# Patient Record
Sex: FEMALE | Race: WHITE | Employment: UNEMPLOYED | URBAN - METROPOLITAN AREA
[De-identification: names, ages, dates, MRNs, and addresses within clinical notes are randomized per-mention and may not be internally consistent; named-entity substitution may affect disease eponyms.]

---

## 2022-10-07 RX ORDER — BUPROPION HYDROCHLORIDE 100 MG/1
100 TABLET ORAL 3 TIMES DAILY
COMMUNITY

## 2022-10-07 RX ORDER — ESCITALOPRAM OXALATE 20 MG/1
20 TABLET ORAL NIGHTLY
COMMUNITY
Start: 2022-03-08

## 2022-10-07 RX ORDER — AMITRIPTYLINE HYDROCHLORIDE 50 MG/1
50 TABLET, FILM COATED ORAL NIGHTLY
COMMUNITY
Start: 2016-05-06

## 2022-10-07 RX ORDER — GABAPENTIN 300 MG/1
CAPSULE ORAL
COMMUNITY
Start: 2019-10-11 | End: 2022-10-23

## 2022-10-07 RX ORDER — BUSPIRONE HYDROCHLORIDE 10 MG/1
10 TABLET ORAL 2 TIMES DAILY
COMMUNITY

## 2022-10-07 RX ORDER — ATORVASTATIN CALCIUM 20 MG/1
TABLET, FILM COATED ORAL
COMMUNITY
Start: 2021-04-22

## 2022-10-07 NOTE — PROGRESS NOTES
[address] [facility] time ____1445________        Surgery time ______1615______    Take the following medications with a sip of water: Follow your MD/Surgeons pre-procedure instructions regarding your medications     Do not eat or drink anything after 12:00 midnight prior to your surgery. This includes water chewing gum, mints and ice chips. You may brush your teeth and gargle the morning of your surgery, but do not swallow the water     Please see your family doctor/pediatrician for a history and physical and/or concerning medications. Bring any test results/reports from your physicians office. If you are under the care of a heart doctor or specialist doctor, please be aware that you may be asked to them for clearance    You may be asked to stop blood thinners such as Coumadin, Plavix, Fragmin, Lovenox, etc., or any anti-inflammatories such as:  Aspirin, Ibuprofen, Advil, Naproxen prior to your surgery. We also ask that you stop any OTC medications such as fish oil, vitamin E, glucosamine, garlic, Multivitamins, COQ 10, etc.    We ask that you do not smoke 24 hours prior to surgery  We ask that you do not  drink any alcoholic beverages 24 hours prior to surgery     You must make arrangements for a responsible adult to take you home after your surgery. For your safety you will not be allowed to leave alone or drive yourself home. Your surgery will be cancelled if you do not have a ride home. Also for your safety, it is strongly suggested that someone stay with you the first 24 hours after your surgery. A parent or legal guardian must accompany a child scheduled for surgery and plan to stay at the hospital until the child is discharged. Please do not bring other children with you. For your comfort, please wear simple loose fitting clothing to the hospital.  Please do not bring valuables.     Do not wear any make-up or nail polish on your fingers or toes      For your safety, please do not wear any jewelry or body piercing's on the day of surgery. All jewelry must be removed. If you have dentures, they will be removed before going to operating room. For your convenience, we will provide you with a container. If you wear contact lenses or glasses, they will be removed, please bring a case for them. If you have a living will and a durable power of  for healthcare, please bring in a copy. As part of our patient safety program to minimize surgical site infections, we ask you to do the following:    Please notify your surgeon if you develop any illness between         now and the  day of your surgery. This includes a cough, cold, fever, sore throat, nausea,         or vomiting, and diarrhea, etc.   Please notify your surgeon if you experience dizziness, shortness         of breath or blurred vision between now and the time of your surgery. Do not shave your operative site 96 hours prior to surgery. For face and neck surgery, men may use an electric razor 48 hours   prior to surgery. You may shower the night before surgery or the morning of   your surgery with an antibacterial soap. You will need to bring a photo ID and insurance card    [facility] has an onsite pharmacy, would you like to utilize our pharmacy     If you will be staying overnight and use a C-pap machine, please bring   your C-pap to hospital     Our goal is to provide you with excellent care, therefore, visitors will be limited to two(2) in the room at a time so that we may focus on providing this care for you. Please contact pre-admission testing if you have any further questions. [facility] phone number:  [phone] PAT fax number:  [phone]  Please note these are generalized instructions for all surgical cases, you may be provided with more specific instructions according to your surgery.     C-Difficile admission screening and protocol:       * Admitted with diarrhea? [] YES    [x]  NO     *Prior history of C-Diff. In last 3 months? [] YES    [x]  NO     *Antibiotic use in the past 6-8 weeks? [x]  NO    []  YES                 If yes, which ANTIBIOTIC AND REASON______     *Prior hospitalization or nursing home in the last month? []  YES    [x]  NO        SAFETY FIRST. .call before you fall

## 2022-10-14 ENCOUNTER — ANESTHESIA EVENT (OUTPATIENT)
Dept: OPERATING ROOM | Age: 67
End: 2022-10-14
Payer: COMMERCIAL

## 2022-10-15 ASSESSMENT — LIFESTYLE VARIABLES: SMOKING_STATUS: 0

## 2022-10-15 NOTE — ANESTHESIA PRE PROCEDURE
Department of Anesthesiology  Preprocedure Note       Name:  Alexandr Walker   Age:  61 y.o.  :  1955                                          MRN:  [id number]         Date:  10/17/2022      Surgeon: Es Raphael):  Benny Damian MD    Procedure: Procedure(s):  CYSTOSCOPY TRANS VAGINAL TAPE REVISION    Medications prior to admission:   Prior to Admission medications    Medication Sig Start Date End Date Taking? Authorizing Provider   atorvastatin (LIPITOR) 20 MG tablet TAKE 1 TABLET BY MOUTH EVERY DAY  Patient not taking: Reported on 10/17/2022 4/22/21  Yes Historical Provider, MD   amitriptyline (ELAVIL) 50 MG tablet Take 50 mg by mouth nightly 16  Yes Historical Provider, MD   escitalopram (LEXAPRO) 20 MG tablet Take 20 mg by mouth nightly 3/8/22  Yes Historical Provider, MD   gabapentin (NEURONTIN) 300 MG capsule TAKE 1 CAPSULE BY MOUTH 3 TIMES DAILY FOR 90 DAYS. 10/11/19 10/23/22 Yes Historical Provider, MD   metoprolol tartrate (LOPRESSOR) 25 MG tablet TAKE 1/2 TABLET BY MOUTH TWICE A DAY 5/3/21  Yes Historical Provider, MD   buPROPion (WELLBUTRIN) 100 MG tablet Take 100 mg by mouth 3 times daily    Historical Provider, MD   busPIRone (BUSPAR) 10 MG tablet Take 10 mg by mouth 2 times daily    Historical Provider, MD   oxybutynin (DITROPAN) 5 MG tablet Take 5 mg by mouth 2 times daily. Patient not taking: Reported on 10/17/2022    Historical Provider, MD   zolpidem (AMBIEN) 10 MG tablet Take  by mouth nightly as needed for Sleep. Historical Provider, MD   ALPRAZolam Jennifer Schillings) 1 MG tablet Take 1 mg by mouth 2 times daily as needed for Sleep. Historical Provider, MD   sertraline (ZOLOFT) 50 MG tablet Take 50 mg by mouth daily.     Historical Provider, MD       Current medications:    Current Facility-Administered Medications   Medication Dose Route Frequency Provider Last Rate Last Admin    cefTRIAXone (ROCEPHIN) 1,000 mg in dextrose 5 % 50 mL IVPB mini-bag  1,000 mg IntraVENous Once Liss Kong MD        0.9 % sodium chloride infusion   IntraVENous Continuous Carole Luis MD        sodium chloride flush 0.9 % injection 5-40 mL  5-40 mL IntraVENous 2 times per day Carole Luis MD        sodium chloride flush 0.9 % injection 5-40 mL  5-40 mL IntraVENous PRFRANCIS Luis MD        0.9 % sodium chloride infusion   IntraVENous PRFRANCIS Luis MD           Allergies: Allergies   Allergen Reactions    Codeine Itching and Rash       Problem List:    Patient Active Problem List   Diagnosis Code    Carpal tunnel syndrome G56.00       Past Medical History:        Diagnosis Date    Anxiety and depression     Arthritis     Frequent urination     Hyperlipidemia     Hypertension     IBS (irritable bowel syndrome)     Irregular heartbeat     Prolonged emergence from general anesthesia     UTI (lower urinary tract infection)        Past Surgical History:        Procedure Laterality Date    APPENDECTOMY      CARPAL TUNNEL RELEASE Bilateral     bilat.  CYSTOSCOPY  10/20/2014    CYSTOSCOPY, URETHRAL DILITATION           EYE SURGERY Left     removal of debris x3    FOOT SURGERY Right     heel spur etc right    HYSTERECTOMY (CERVIX STATUS UNKNOWN)      part.  vag    JOINT REPLACEMENT Right     total knee replacement    TUBAL LIGATION         Social History:    Social History     Tobacco Use    Smoking status: Never    Smokeless tobacco: Never   Substance Use Topics    Alcohol use: Yes     Comment: rare                                Counseling given: Not Answered      Vital Signs (Current):   Vitals:    10/07/22 1239 10/17/22 1441   BP:  126/84   Pulse:  77   Resp:  18   Temp:  97.3 °F (36.3 °C)   TempSrc:  Temporal   SpO2:  98%   Weight: 140 lb (63.5 kg)    Height: 5' 2\" (1.575 m) 5' 2\" (1.575 m)                                              BP Readings from Last 3 Encounters:   10/17/22 126/84   10/20/14 113/69   09/24/13 139/79       NPO Status: Time of last liquid consumption: 2359                        Time of last solid consumption: 2330                        Date of last liquid consumption: 10/16/22                        Date of last solid food consumption: 10/16/22    BMI:   Wt Readings from Last 3 Encounters:   10/07/22 140 lb (63.5 kg)   10/20/14 101 lb 13.6 oz (46.2 kg)   10/15/14 100 lb (45.4 kg)     Body mass index is 25.61 kg/m². CBC: No results found for: WBC, RBC, HGB, HCT, MCV, RDW, PLT    CMP: No results found for: NA, K, CL, CO2, BUN, CREATININE, GFRAA, AGRATIO, LABGLOM, GLUCOSE, GLU, PROT, CALCIUM, BILITOT, ALKPHOS, AST, ALT    POC Tests: No results for input(s): POCGLU, POCNA, POCK, POCCL, POCBUN, POCHEMO, POCHCT in the last 72 hours. Coags: No results found for: PROTIME, INR, APTT    HCG (If Applicable): No results found for: PREGTESTUR, PREGSERUM, HCG, HCGQUANT     ABGs: No results found for: PHART, PO2ART, KJZ0KFC, AFD0RZP, BEART, M9XTINMV     Type & Screen (If Applicable):  No results found for: LABABO, LABRH    Drug/Infectious Status (If Applicable):  No results found for: HIV, HEPCAB    COVID-19 Screening (If Applicable): No results found for: COVID19        Anesthesia Evaluation   no history of anesthetic complications (prolonged emergence reported in past):   Airway: Mallampati: I  TM distance: >3 FB   Neck ROM: full  Comment: Prior airway:  Present prior to arrival:No; CO2 waveform:Yes; Breath sounds verified:Yes, Equal, Bilateral; ETT Size:7 mm; Cuffed:Cuffed; Direct Laryngoscopy attempts:1; Laryngoscope type:Márquez;  Laryngoscope size:2; Grade view:II; Rapid sequence:No; Cricoid pressure applied:No; Procedure status:No trauma, Dentition as pre-op  Mouth opening: > = 3 FB   Dental:      Comment: Small chip noted on upper incisor    Pulmonary:       (-) COPD, asthma, rhonchi, wheezes, rales and not a current smoker                           Cardiovascular:  Exercise tolerance: good (>4 METS),   (+) hypertension:, dysrhythmias: SVT, hyperlipidemia    (-) CAD (calcium score 17.6), weak pulses and peripheral edema      Rhythm: regular  Rate: normal           Beta Blocker:  Dose within 24 Hrs (metoprolol)      ROS comment: EKG: NSR, wnl       Neuro/Psych:   (+) depression/anxiety  (Xanax)            GI/Hepatic/Renal:        (-) liver disease, no renal disease and no morbid obesity      ROS comment: h/o urge incontenence presenting for revision of urethral sling by Dr. Arpan Mayfield 6/21/2022 at [facility] .   Endo/Other:    (+) : arthritis: OA., .    (-) diabetes mellitus, blood dyscrasia               Abdominal:         (-) obese Abdomen: soft. Vascular: Other Findings: Patient reports she is unable to remove nose ring          Anesthesia Plan      MAC     ASA 2     (Required ephedrine during multiple prior procedures at [facility].  NPO appropriate. Ms. Hood Balderrama denies active nausea / reflux.)      MIPS: Postoperative opioids intended and Prophylactic antiemetics administered. Anesthetic plan and risks discussed with patient. Use of blood products discussed with patient whom consented to blood products. Plan discussed with CRNA. This pre-anesthesia assessment may be used as a history and physical.    DOS STAFF ADDENDUM:    Pt seen and examined, chart reviewed (including anesthesia, drug and allergy history). No interval changes to history and physical examination. Anesthetic plan, risks, benefits, alternatives, and personnel involved discussed with patient. Patient verbalized an understanding and agrees to proceed.       Ruy Pena MD  October 17, 2022  2:53 PM

## 2022-10-17 ENCOUNTER — HOSPITAL ENCOUNTER (OUTPATIENT)
Age: 67
Setting detail: OUTPATIENT SURGERY
Discharge: HOME | End: 2022-10-17
Attending: UROLOGY | Admitting: UROLOGY
Payer: COMMERCIAL

## 2022-10-17 ENCOUNTER — ANESTHESIA (OUTPATIENT)
Dept: OPERATING ROOM | Age: 67
End: 2022-10-17
Payer: COMMERCIAL

## 2022-10-17 VITALS
HEART RATE: 92 BPM | WEIGHT: 140 LBS | OXYGEN SATURATION: 92 % | BODY MASS INDEX: 25.76 KG/M2 | TEMPERATURE: 97.2 F | RESPIRATION RATE: 25 BRPM | HEIGHT: 62 IN | SYSTOLIC BLOOD PRESSURE: 108 MMHG | DIASTOLIC BLOOD PRESSURE: 77 MMHG

## 2022-10-17 PROCEDURE — 7100000010 HC PHASE II RECOVERY - FIRST 15 MIN: Performed by: UROLOGY

## 2022-10-17 PROCEDURE — 6360000002 HC RX W HCPCS: Performed by: UROLOGY

## 2022-10-17 PROCEDURE — 7100000011 HC PHASE II RECOVERY - ADDTL 15 MIN: Performed by: UROLOGY

## 2022-10-17 PROCEDURE — 2580000003 HC RX 258: Performed by: UROLOGY

## 2022-10-17 PROCEDURE — A4217 STERILE WATER/SALINE, 500 ML: HCPCS | Performed by: UROLOGY

## 2022-10-17 PROCEDURE — 3600000014 HC SURGERY LEVEL 4 ADDTL 15MIN: Performed by: UROLOGY

## 2022-10-17 PROCEDURE — 2500000003 HC RX 250 WO HCPCS: Performed by: NURSE ANESTHETIST, CERTIFIED REGISTERED

## 2022-10-17 PROCEDURE — 2580000003 HC RX 258: Performed by: NURSE ANESTHETIST, CERTIFIED REGISTERED

## 2022-10-17 PROCEDURE — 2709999900 HC NON-CHARGEABLE SUPPLY: Performed by: UROLOGY

## 2022-10-17 PROCEDURE — 6360000002 HC RX W HCPCS: Performed by: NURSE ANESTHETIST, CERTIFIED REGISTERED

## 2022-10-17 PROCEDURE — 2500000003 HC RX 250 WO HCPCS: Performed by: UROLOGY

## 2022-10-17 PROCEDURE — 3700000001 HC ADD 15 MINUTES (ANESTHESIA): Performed by: UROLOGY

## 2022-10-17 PROCEDURE — 3600000004 HC SURGERY LEVEL 4 BASE: Performed by: UROLOGY

## 2022-10-17 PROCEDURE — 7100000000 HC PACU RECOVERY - FIRST 15 MIN: Performed by: UROLOGY

## 2022-10-17 PROCEDURE — 7100000001 HC PACU RECOVERY - ADDTL 15 MIN: Performed by: UROLOGY

## 2022-10-17 PROCEDURE — 3700000000 HC ANESTHESIA ATTENDED CARE: Performed by: UROLOGY

## 2022-10-17 RX ORDER — FENTANYL CITRATE 50 UG/ML
INJECTION, SOLUTION INTRAMUSCULAR; INTRAVENOUS PRN
Status: DISCONTINUED | OUTPATIENT
Start: 2022-10-17 | End: 2022-10-17 | Stop reason: SDUPTHER

## 2022-10-17 RX ORDER — ONDANSETRON 2 MG/ML
INJECTION INTRAMUSCULAR; INTRAVENOUS PRN
Status: DISCONTINUED | OUTPATIENT
Start: 2022-10-17 | End: 2022-10-17 | Stop reason: SDUPTHER

## 2022-10-17 RX ORDER — SODIUM CHLORIDE 0.9 % (FLUSH) 0.9 %
5-40 SYRINGE (ML) INJECTION EVERY 12 HOURS SCHEDULED
Status: DISCONTINUED | OUTPATIENT
Start: 2022-10-17 | End: 2022-10-17 | Stop reason: HOSPADM

## 2022-10-17 RX ORDER — PROCHLORPERAZINE EDISYLATE 5 MG/ML
5 INJECTION INTRAMUSCULAR; INTRAVENOUS
Status: DISCONTINUED | OUTPATIENT
Start: 2022-10-17 | End: 2022-10-17 | Stop reason: HOSPADM

## 2022-10-17 RX ORDER — GLYCOPYRROLATE 0.2 MG/ML
INJECTION INTRAMUSCULAR; INTRAVENOUS PRN
Status: DISCONTINUED | OUTPATIENT
Start: 2022-10-17 | End: 2022-10-17 | Stop reason: SDUPTHER

## 2022-10-17 RX ORDER — PROPOFOL 10 MG/ML
INJECTION, EMULSION INTRAVENOUS PRN
Status: DISCONTINUED | OUTPATIENT
Start: 2022-10-17 | End: 2022-10-17 | Stop reason: SDUPTHER

## 2022-10-17 RX ORDER — LABETALOL HYDROCHLORIDE 5 MG/ML
5 INJECTION, SOLUTION INTRAVENOUS
Status: DISCONTINUED | OUTPATIENT
Start: 2022-10-17 | End: 2022-10-17 | Stop reason: HOSPADM

## 2022-10-17 RX ORDER — SODIUM CHLORIDE 9 MG/ML
INJECTION, SOLUTION INTRAVENOUS CONTINUOUS
Status: DISCONTINUED | OUTPATIENT
Start: 2022-10-17 | End: 2022-10-17 | Stop reason: HOSPADM

## 2022-10-17 RX ORDER — SODIUM CHLORIDE 0.9 % (FLUSH) 0.9 %
5-40 SYRINGE (ML) INJECTION PRN
Status: DISCONTINUED | OUTPATIENT
Start: 2022-10-17 | End: 2022-10-17 | Stop reason: HOSPADM

## 2022-10-17 RX ORDER — ONDANSETRON 2 MG/ML
4 INJECTION INTRAMUSCULAR; INTRAVENOUS
Status: DISCONTINUED | OUTPATIENT
Start: 2022-10-17 | End: 2022-10-17 | Stop reason: HOSPADM

## 2022-10-17 RX ORDER — SODIUM CHLORIDE 9 MG/ML
INJECTION, SOLUTION INTRAVENOUS CONTINUOUS PRN
Status: DISCONTINUED | OUTPATIENT
Start: 2022-10-17 | End: 2022-10-17 | Stop reason: SDUPTHER

## 2022-10-17 RX ORDER — MIDAZOLAM HYDROCHLORIDE 1 MG/ML
INJECTION INTRAMUSCULAR; INTRAVENOUS PRN
Status: DISCONTINUED | OUTPATIENT
Start: 2022-10-17 | End: 2022-10-17 | Stop reason: SDUPTHER

## 2022-10-17 RX ORDER — DEXAMETHASONE SODIUM PHOSPHATE 4 MG/ML
INJECTION, SOLUTION INTRA-ARTICULAR; INTRALESIONAL; INTRAMUSCULAR; INTRAVENOUS; SOFT TISSUE PRN
Status: DISCONTINUED | OUTPATIENT
Start: 2022-10-17 | End: 2022-10-17 | Stop reason: SDUPTHER

## 2022-10-17 RX ORDER — LIDOCAINE HYDROCHLORIDE AND EPINEPHRINE 20; 5 MG/ML; UG/ML
INJECTION, SOLUTION EPIDURAL; INFILTRATION; INTRACAUDAL; PERINEURAL
Status: COMPLETED | OUTPATIENT
Start: 2022-10-17 | End: 2022-10-17

## 2022-10-17 RX ORDER — FENTANYL CITRATE 50 UG/ML
25 INJECTION, SOLUTION INTRAMUSCULAR; INTRAVENOUS EVERY 5 MIN PRN
Status: DISCONTINUED | OUTPATIENT
Start: 2022-10-17 | End: 2022-10-17 | Stop reason: HOSPADM

## 2022-10-17 RX ORDER — MAGNESIUM HYDROXIDE 1200 MG/15ML
LIQUID ORAL CONTINUOUS PRN
Status: DISCONTINUED | OUTPATIENT
Start: 2022-10-17 | End: 2022-10-17 | Stop reason: HOSPADM

## 2022-10-17 RX ORDER — MAGNESIUM HYDROXIDE 1200 MG/15ML
LIQUID ORAL
Status: COMPLETED | OUTPATIENT
Start: 2022-10-17 | End: 2022-10-17

## 2022-10-17 RX ORDER — SODIUM CHLORIDE 9 MG/ML
INJECTION, SOLUTION INTRAVENOUS PRN
Status: DISCONTINUED | OUTPATIENT
Start: 2022-10-17 | End: 2022-10-17 | Stop reason: HOSPADM

## 2022-10-17 RX ORDER — IPRATROPIUM BROMIDE AND ALBUTEROL SULFATE 2.5; .5 MG/3ML; MG/3ML
1 SOLUTION RESPIRATORY (INHALATION)
Status: DISCONTINUED | OUTPATIENT
Start: 2022-10-17 | End: 2022-10-17 | Stop reason: HOSPADM

## 2022-10-17 RX ORDER — HYDRALAZINE HYDROCHLORIDE 20 MG/ML
5 INJECTION INTRAMUSCULAR; INTRAVENOUS
Status: DISCONTINUED | OUTPATIENT
Start: 2022-10-17 | End: 2022-10-17 | Stop reason: HOSPADM

## 2022-10-17 RX ORDER — PHENYLEPHRINE HCL IN 0.9% NACL 1 MG/10 ML
SYRINGE (ML) INTRAVENOUS PRN
Status: DISCONTINUED | OUTPATIENT
Start: 2022-10-17 | End: 2022-10-17 | Stop reason: SDUPTHER

## 2022-10-17 RX ORDER — LIDOCAINE HYDROCHLORIDE 20 MG/ML
INJECTION, SOLUTION EPIDURAL; INFILTRATION; INTRACAUDAL; PERINEURAL PRN
Status: DISCONTINUED | OUTPATIENT
Start: 2022-10-17 | End: 2022-10-17 | Stop reason: SDUPTHER

## 2022-10-17 RX ADMIN — DEXAMETHASONE SODIUM PHOSPHATE 8 MG: 4 INJECTION, SOLUTION INTRAMUSCULAR; INTRAVENOUS at 15:58

## 2022-10-17 RX ADMIN — SODIUM CHLORIDE: 9 INJECTION, SOLUTION INTRAVENOUS at 15:49

## 2022-10-17 RX ADMIN — Medication 200 MCG: at 16:19

## 2022-10-17 RX ADMIN — FENTANYL CITRATE 50 MCG: 50 INJECTION INTRAMUSCULAR; INTRAVENOUS at 15:55

## 2022-10-17 RX ADMIN — MIDAZOLAM 2 MG: 1 INJECTION INTRAMUSCULAR; INTRAVENOUS at 15:49

## 2022-10-17 RX ADMIN — GLYCOPYRROLATE 0.2 MG: 0.2 INJECTION, SOLUTION INTRAMUSCULAR; INTRAVENOUS at 15:58

## 2022-10-17 RX ADMIN — FENTANYL CITRATE 50 MCG: 50 INJECTION INTRAMUSCULAR; INTRAVENOUS at 15:58

## 2022-10-17 RX ADMIN — ONDANSETRON 4 MG: 2 INJECTION INTRAMUSCULAR; INTRAVENOUS at 15:58

## 2022-10-17 RX ADMIN — PROPOFOL 100 MG: 10 INJECTION, EMULSION INTRAVENOUS at 15:53

## 2022-10-17 RX ADMIN — LIDOCAINE HYDROCHLORIDE 60 MG: 20 INJECTION, SOLUTION EPIDURAL; INFILTRATION; INTRACAUDAL; PERINEURAL at 15:53

## 2022-10-17 RX ADMIN — CEFTRIAXONE 1000 MG: 1 INJECTION, POWDER, FOR SOLUTION INTRAMUSCULAR; INTRAVENOUS at 15:49

## 2022-10-17 ASSESSMENT — PAIN - FUNCTIONAL ASSESSMENT: PAIN_FUNCTIONAL_ASSESSMENT: 0-10

## 2022-10-17 NOTE — ANESTHESIA POSTPROCEDURE EVALUATION
Department of Anesthesiology  Postprocedure Note    Patient: Weston Flood  MRN: [id number]  YOB: 1955  Date of evaluation: 10/17/2022      Procedure Summary     Date: 10/17/22 Room / Location: \[facility]\"" 06 / 1 [facility]    Anesthesia Start: 3721 Anesthesia Stop: 1634    Procedure: CYSTOSCOPY TRANS VAGINAL TAPE REVISION Diagnosis:       Urge incontinence      (Sukumar Rascon)    Surgeons: Melody Shah MD Responsible Provider: Nadya Mccall MD    Anesthesia Type: General ASA Status: 2          Anesthesia Type: General    Anton Phase I: Anton Score: 10    Anton Phase II: Anton Score: 10      Anesthesia Post Evaluation    Patient location during evaluation: PACU  Patient participation: complete - patient participated  Level of consciousness: awake and alert  Airway patency: patent  Nausea & Vomiting: no nausea and no vomiting  Complications: no  Cardiovascular status: hemodynamically stable  Respiratory status: acceptable  Hydration status: stable

## 2022-10-17 NOTE — PROGRESS NOTES
Pt up to chair with no issues. IV removed. To bathroom with assistance. Discharge instructions given to  on phone and pt at bedside.

## 2022-10-17 NOTE — DISCHARGE INSTRUCTIONS
No lifting > 15 pounds X 2 weeks    No intercourse X 4 weeks    OK to shower, no tub baths    Advil or tylenol prn for pain

## 2022-10-17 NOTE — H&P
History of Present Illness: Louise Merino is a 61 y.o. female with obstruction due to TVT. She is here for sling revision/ incision. Denies pain or fever. Past Medical History:   Past Medical History:   Diagnosis Date    Anxiety and depression     Arthritis     Frequent urination     Hyperlipidemia     Hypertension     IBS (irritable bowel syndrome)     Irregular heartbeat     Prolonged emergence from general anesthesia     UTI (lower urinary tract infection)        Past Surgical History:  Past Surgical History:   Procedure Laterality Date    APPENDECTOMY      CARPAL TUNNEL RELEASE Bilateral     bilat. CYSTOSCOPY  10/20/2014    CYSTOSCOPY, URETHRAL DILITATION           EYE SURGERY Left     removal of debris x3    FOOT SURGERY Right     heel spur etc right    HYSTERECTOMY (CERVIX STATUS UNKNOWN)      part. vag    JOINT REPLACEMENT Right     total knee replacement    TUBAL LIGATION         Social History:  Social History     Socioeconomic History    Marital status:       Spouse name: Not on file    Number of children: Not on file    Years of education: Not on file    Highest education level: Not on file   Occupational History    Not on file   Tobacco Use    Smoking status: Never    Smokeless tobacco: Never   Vaping Use    Vaping Use: Never used   Substance and Sexual Activity    Alcohol use: Yes     Comment: rare    Drug use: Never    Sexual activity: Not Currently   Other Topics Concern    Not on file   Social History Narrative    Not on file     Social Determinants of Health     Financial Resource Strain: Not on file   Food Insecurity: Not on file   Transportation Needs: Not on file   Physical Activity: Not on file   Stress: Not on file   Social Connections: Not on file   Intimate Partner Violence: Not on file   Housing Stability: Not on file       Family History:  Family History   Problem Relation Age of Onset    Arthritis Mother     Cancer Mother     Heart Disease Mother     No Known Problems Father        Meds:   Current Facility-Administered Medications: cefTRIAXone (ROCEPHIN) 1,000 mg in dextrose 5 % 50 mL IVPB mini-bag, 1,000 mg, IntraVENous, Once  0.9 % sodium chloride infusion, , IntraVENous, Continuous  sodium chloride flush 0.9 % injection 5-40 mL, 5-40 mL, IntraVENous, 2 times per day  sodium chloride flush 0.9 % injection 5-40 mL, 5-40 mL, IntraVENous, PRN  0.9 % sodium chloride infusion, , IntraVENous, PRN    Vitals:  /84   Pulse 77   Temp 97.3 °F (36.3 °C) (Temporal)   Resp 18   Ht 5' 2\" (1.575 m)   Wt 140 lb (63.5 kg)   SpO2 98%   BMI 25.61 kg/m²   No intake or output data in the 24 hours ending 10/17/22 1449    Review of Systems:  10 Systems were reviewed and negative except as in HPI      Physical Exam:  General Appearance: Alert and oriented, cooperative, no distress, appears stated age  Head: Normocephalic, without obvious abnormality, atraumatic  Back: no CVA tenderness  Lungs: respirations unlabored  Heart: Regular rate and rhythm  Abdomen: Soft, non-tender, non-distended, no masses  Skin: Skin color, texture, turgor normal, no rashes or lesions  Neurologic: no gross deficits  Female :   Bladder is non tender  No CVA tenderness    Pelvic Exam Not Indicated    Labs:  CBC No results found for: WBC, RBC, HGB, HCT, MCV, MCH, MCHC, RDW, PLT, MPV  BMP No results found for: NA, K, CL, CO2, BUN, CREATININE, GLUCOSE, CALCIUM    Urinalysis: No results found for: COLORU, GLUCOSEU, BLOODU, NITRU, LEUKOCYTESUR    Imaging: NA   Impression/Plan: obstruction due to TVT    Incision of TVT and cysto. Risks including but not limited to bleeding, infection, persistent incontinence, urethral injury and need for further procedures explained.    Holli Toscano MD 10/17/41204:49 PM

## 2022-10-17 NOTE — BRIEF OP NOTE
Brief Postoperative Note      Patient: Devin Wadsworth  YOB: 1955  MRN: [id number]    Date of Procedure: 10/17/2022    Pre-Op Diagnosis: urinary obstruction due to TVT sling    Post-Op Diagnosis: Same       Procedure(s):  CYSTOSCOPY TRANS VAGINAL TAPE REVISION/ incision, cystoscopy    Surgeon(s):  Jose Taveras MD    Assistant:  Surgical Assistant: Alfredo Barakat    Anesthesia: Monitor Anesthesia Care    Estimated Blood Loss (mL): less than 50     Complications: None    Specimens:   * No specimens in log *    Implants:  * No implants in log *      Drains:   [REMOVED] Urinary Catheter 10/17/22 2 Way (Removed)       Findings: sling incised in the midline    Electronically signed by Jose Taveras MD on 10/17/2022 at 4:29 PM

## 2022-10-17 NOTE — PROGRESS NOTES
1632 pt arrived from OR, sedate with OPA present. Abdomen soft. No external incisions, peripad and mesh panties present- no drainage on pad. Pt arouses to voice to remove airway, remains very lethargic.

## 2022-10-18 NOTE — OP NOTE
[address] [place] [address] Min Calero                                 OPERATIVE REPORT    PATIENT NAME: Jalyn Fabian                    :        1955  MED REC NO:   [id number]                          ROOM:  ACCOUNT NO:   [de-identified]                           ADMIT DATE: 10/17/2022  PROVIDER:     Susi Mora MD    DATE OF PROCEDURE:  10/17/2022    PREOPERATIVE DIAGNOSIS:  Obstruction due to a TVT sling. POSTOPERATIVE DIAGNOSIS:  Obstruction due to a TVT sling. OPERATION PERFORMED:  Incision of TVT sling and cystoscopy. ANESTHESIA:  General.    COMPLICATIONS:  None. ESTIMATED BLOOD LOSS:  Less than 50 mL. INDICATIONS  A 28-year-old female initially presented with incontinence. She had a retropubic mid urethral sling placed on 2022. She has  had worsening episodes of urgency and incontinence since the surgery. Her post void residual was elevated at 250 mL. Urodynamics were  concerning for obstruction and she is here for incision of the sling. OPERATIVE PROCEDURE:  She was given antibiotics and taken to the  operative suite. She moved onto the table. Anesthesia was induced. Her position was changed to the lithotomy position. Her genitalia were  prepped and draped. The 21-Lithuanian cystoscope advanced easily through  the urethra into the bladder. The bladder was carefully evaluated. Both ureteral orifices were normal.  The bladder mucosa was normal.   There was no evidence of foreign body in the bladder. The cystoscope  was withdrawn to carefully evaluate the urethra. No urethral  abnormalities were identified. An 18-Lithuanian Rowland catheter was then  inserted. The level of the mid urethra was identified in the anterior  vaginal wall. The site was anesthetized with 2% lidocaine with  epinephrine.   Approximately 1-cm incision was made in the anterior  vaginal wall at the level of the mid

## 2023-03-24 RX ORDER — GABAPENTIN 300 MG/1
300 CAPSULE ORAL 3 TIMES DAILY
COMMUNITY

## 2023-03-24 RX ORDER — ASPIRIN 81 MG/1
81 TABLET, CHEWABLE ORAL DAILY
COMMUNITY

## 2023-03-24 NOTE — PROGRESS NOTES
Covid testing to be done: If positive---Pt instructed to notify MD ASAP  If negative--pt was instructed to bring Hard copy of Covid results DOP/DOS    Preoperative Screening for Elective Surgery/Invasive Procedures While COVID-19 present in the community     Have you tested positive or have been told to self-isolate for COVID-19 like symptoms within the past 28 days? NO  Do you currently have any of the following symptoms? Fever >100.0 F or 99.9 F in immunocompromised patients? NO  New onset cough, shortness of breath or difficulty breathing? NO  New onset sore throat, myalgia (muscle aches and pains), headache, loss of taste/smell or diarrhea? NO  Have you had a potential exposure to COVID-19 within the past 14 days by:  Close contact with a confirmed case? NO  Close contact with a healthcare worker,  or essential infrastructure worker (grocery store, [employer], gas station, public utilities or transportation)? YES  Do you reside in a congregate setting such as; skilled nursing facility, adult home, correctional facility, homeless shelter or other institutional setting? NO  Have you had recent travel to a known COVID-19 hotspot? NO     Indicate if the patient has a positive screen by answering yes to one or more of the above questions. If patient is unable to obtain a COVID test prior to DOS/DOP will need RAPID DOS. C-Difficile admission screening and protocol:       * Admitted with diarrhea? [] YES    [x]  NO     *Prior history of C-Diff. In last 3 months? [] YES    [x]  NO     *Antibiotic use in the past 6-8 weeks? [x]  NO    []  YES      If yes, which: REASON_________________     *Prior hospitalization or nursing home in the last month? []  YES    [x]  NO     SAFETY FIRST. .call before you fall    [address] [facility] time_1400___________        Surgery time__1520__________    Do not eat or drink anything after 12:00 midnight

## 2023-03-29 ENCOUNTER — ANESTHESIA EVENT (OUTPATIENT)
Dept: OPERATING ROOM | Age: 68
End: 2023-03-29
Payer: COMMERCIAL

## 2023-03-30 ENCOUNTER — HOSPITAL ENCOUNTER (OUTPATIENT)
Age: 68
Setting detail: OUTPATIENT SURGERY
Discharge: HOME | End: 2023-03-30
Attending: UROLOGY | Admitting: UROLOGY
Payer: COMMERCIAL

## 2023-03-30 ENCOUNTER — ANESTHESIA (OUTPATIENT)
Dept: OPERATING ROOM | Age: 68
End: 2023-03-30
Payer: COMMERCIAL

## 2023-03-30 VITALS
SYSTOLIC BLOOD PRESSURE: 138 MMHG | WEIGHT: 143.5 LBS | HEART RATE: 65 BPM | TEMPERATURE: 97 F | HEIGHT: 62 IN | RESPIRATION RATE: 16 BRPM | BODY MASS INDEX: 26.41 KG/M2 | OXYGEN SATURATION: 100 % | DIASTOLIC BLOOD PRESSURE: 78 MMHG

## 2023-03-30 PROCEDURE — 2500000003 HC RX 250 WO HCPCS

## 2023-03-30 PROCEDURE — L8606 SYNTHETIC IMPLNT URINARY 1ML: HCPCS | Performed by: UROLOGY

## 2023-03-30 PROCEDURE — 7100000011 HC PHASE II RECOVERY - ADDTL 15 MIN: Performed by: UROLOGY

## 2023-03-30 PROCEDURE — 6360000002 HC RX W HCPCS: Performed by: UROLOGY

## 2023-03-30 PROCEDURE — 6360000002 HC RX W HCPCS

## 2023-03-30 PROCEDURE — 7100000000 HC PACU RECOVERY - FIRST 15 MIN: Performed by: UROLOGY

## 2023-03-30 PROCEDURE — 7100000010 HC PHASE II RECOVERY - FIRST 15 MIN: Performed by: UROLOGY

## 2023-03-30 PROCEDURE — 2580000003 HC RX 258: Performed by: UROLOGY

## 2023-03-30 PROCEDURE — 3600000002 HC SURGERY LEVEL 2 BASE: Performed by: UROLOGY

## 2023-03-30 PROCEDURE — 3700000000 HC ANESTHESIA ATTENDED CARE: Performed by: UROLOGY

## 2023-03-30 PROCEDURE — 3600000012 HC SURGERY LEVEL 2 ADDTL 15MIN: Performed by: UROLOGY

## 2023-03-30 PROCEDURE — 7100000001 HC PACU RECOVERY - ADDTL 15 MIN: Performed by: UROLOGY

## 2023-03-30 PROCEDURE — 3700000001 HC ADD 15 MINUTES (ANESTHESIA): Performed by: UROLOGY

## 2023-03-30 PROCEDURE — 2580000003 HC RX 258: Performed by: ANESTHESIOLOGY

## 2023-03-30 PROCEDURE — 2709999900 HC NON-CHARGEABLE SUPPLY: Performed by: UROLOGY

## 2023-03-30 DEVICE — BULKAMID URETHRAL BULKING SYSTEM 2ML
Type: IMPLANTABLE DEVICE | Site: BLADDER | Status: FUNCTIONAL
Brand: BULKAMID

## 2023-03-30 RX ORDER — SODIUM CHLORIDE 9 MG/ML
INJECTION, SOLUTION INTRAVENOUS PRN
Status: DISCONTINUED | OUTPATIENT
Start: 2023-03-30 | End: 2023-03-30 | Stop reason: HOSPADM

## 2023-03-30 RX ORDER — PROPOFOL 10 MG/ML
INJECTION, EMULSION INTRAVENOUS PRN
Status: DISCONTINUED | OUTPATIENT
Start: 2023-03-30 | End: 2023-03-30 | Stop reason: SDUPTHER

## 2023-03-30 RX ORDER — SODIUM CHLORIDE 0.9 % (FLUSH) 0.9 %
5-40 SYRINGE (ML) INJECTION PRN
Status: DISCONTINUED | OUTPATIENT
Start: 2023-03-30 | End: 2023-03-30 | Stop reason: HOSPADM

## 2023-03-30 RX ORDER — LIDOCAINE HYDROCHLORIDE 20 MG/ML
INJECTION, SOLUTION EPIDURAL; INFILTRATION; INTRACAUDAL; PERINEURAL PRN
Status: DISCONTINUED | OUTPATIENT
Start: 2023-03-30 | End: 2023-03-30 | Stop reason: SDUPTHER

## 2023-03-30 RX ORDER — MAGNESIUM HYDROXIDE 1200 MG/15ML
LIQUID ORAL
Status: COMPLETED | OUTPATIENT
Start: 2023-03-30 | End: 2023-03-30

## 2023-03-30 RX ORDER — FENTANYL CITRATE 50 UG/ML
50 INJECTION, SOLUTION INTRAMUSCULAR; INTRAVENOUS EVERY 5 MIN PRN
Status: DISCONTINUED | OUTPATIENT
Start: 2023-03-30 | End: 2023-03-30 | Stop reason: HOSPADM

## 2023-03-30 RX ORDER — MEPERIDINE HYDROCHLORIDE 25 MG/ML
12.5 INJECTION INTRAMUSCULAR; INTRAVENOUS; SUBCUTANEOUS
Status: DISCONTINUED | OUTPATIENT
Start: 2023-03-30 | End: 2023-03-30 | Stop reason: HOSPADM

## 2023-03-30 RX ORDER — PROPOFOL 10 MG/ML
INJECTION, EMULSION INTRAVENOUS CONTINUOUS PRN
Status: DISCONTINUED | OUTPATIENT
Start: 2023-03-30 | End: 2023-03-30 | Stop reason: SDUPTHER

## 2023-03-30 RX ORDER — SODIUM CHLORIDE 0.9 % (FLUSH) 0.9 %
5-40 SYRINGE (ML) INJECTION EVERY 12 HOURS SCHEDULED
Status: DISCONTINUED | OUTPATIENT
Start: 2023-03-30 | End: 2023-03-30 | Stop reason: HOSPADM

## 2023-03-30 RX ORDER — ONDANSETRON 2 MG/ML
4 INJECTION INTRAMUSCULAR; INTRAVENOUS
Status: DISCONTINUED | OUTPATIENT
Start: 2023-03-30 | End: 2023-03-30 | Stop reason: HOSPADM

## 2023-03-30 RX ORDER — FENTANYL CITRATE 50 UG/ML
25 INJECTION, SOLUTION INTRAMUSCULAR; INTRAVENOUS EVERY 5 MIN PRN
Status: DISCONTINUED | OUTPATIENT
Start: 2023-03-30 | End: 2023-03-30 | Stop reason: HOSPADM

## 2023-03-30 RX ADMIN — PROPOFOL 20 MG: 10 INJECTION, EMULSION INTRAVENOUS at 15:07

## 2023-03-30 RX ADMIN — LIDOCAINE HYDROCHLORIDE 60 MG: 20 INJECTION, SOLUTION EPIDURAL; INFILTRATION; INTRACAUDAL; PERINEURAL at 15:05

## 2023-03-30 RX ADMIN — PROPOFOL 150 MCG/KG/MIN: 10 INJECTION, EMULSION INTRAVENOUS at 15:05

## 2023-03-30 RX ADMIN — CEFTRIAXONE 1000 MG: 1 INJECTION, POWDER, FOR SOLUTION INTRAMUSCULAR; INTRAVENOUS at 15:03

## 2023-03-30 RX ADMIN — PROPOFOL 50 MG: 10 INJECTION, EMULSION INTRAVENOUS at 15:18

## 2023-03-30 RX ADMIN — PROPOFOL 50 MG: 10 INJECTION, EMULSION INTRAVENOUS at 15:05

## 2023-03-30 RX ADMIN — SODIUM CHLORIDE: 9 INJECTION, SOLUTION INTRAVENOUS at 15:03

## 2023-03-30 RX ADMIN — PROPOFOL 30 MG: 10 INJECTION, EMULSION INTRAVENOUS at 15:11

## 2023-03-30 ASSESSMENT — PAIN SCALES - GENERAL
PAINLEVEL_OUTOF10: 0
PAINLEVEL_OUTOF10: 0

## 2023-03-30 ASSESSMENT — LIFESTYLE VARIABLES: SMOKING_STATUS: 0

## 2023-03-30 NOTE — BRIEF OP NOTE
Brief Postoperative Note      Patient: Stephen Shelby  YOB: 1955  MRN: [id number]    Date of Procedure: 3/30/2023    Pre-Op Diagnosis: stress INCONTINENCE    Post-Op Diagnosis: Same       Procedure(s):  CYSTOSCOPY WITH URETHRAL BULKING INJECTION AGENT - BULKAMID    Surgeon(s):  Ginette Gitelman, MD    Assistant:  * No surgical staff found *    Anesthesia: Monitor Anesthesia Care    Estimated Blood Loss (mL): Minimal    Complications: None    Specimens:   * No specimens in log *    Implants:  Implant Name Type Inv.  Item Serial No.  Lot No. LRB No. Used Action   SYSTEM BULKING PROC BULKAMID URETHRAL - [id number]  SYSTEM BULKING PROC BULKAMID URETHRAL  [employer] [id number] N/A 1 Implanted         Drains: * No LDAs found *    Findings: normal cysto    Electronically signed by Ginette Gitelman, MD on 3/30/2023 at 3:27 PM

## 2023-03-30 NOTE — PROGRESS NOTES
Pt to phase 2 from pacu. Pt a/o x4, states some burning in bladder and needs to urinate. VSS. Pt ambulated to restroom with writer and Olivier pretty. Able to urinate, scant amount of bleeding noted in urine. Pt back in chair at bedside, given po snack. Clothes and family called to bedside, updated on plan of care.

## 2023-03-30 NOTE — PROGRESS NOTES
Pt ambulated to restroom again, still states able to urinate but still having burning. Pt states ready to leave. Tolerated po snack, VSS. Discharge discussed with patient and , verbalized understanding. IV removed. Pt getting dressed.

## 2023-03-30 NOTE — ANESTHESIA PRE PROCEDURE
Department of Anesthesiology  Preprocedure Note       Name:  Nathan Woody   Age:  74 y.o.  :  1955                                          MRN:  [id number]         Date:  3/30/2023      Surgeon: Pedro Pablo Bhakta):  Scooter Coronel MD    Procedure: Procedure(s):  CYSTOSCOPY WITH URETHRAL BULKING INJECTION AGENT - BULKAMID    Medications prior to admission:   Prior to Admission medications    Medication Sig Start Date End Date Taking? Authorizing Provider   aspirin 81 MG chewable tablet Take 81 mg by mouth daily    Historical Provider, MD   gabapentin (NEURONTIN) 300 MG capsule Take 300 mg by mouth 3 times daily. Historical Provider, MD   atorvastatin (LIPITOR) 20 MG tablet TAKE 1 TABLET BY MOUTH EVERY DAY  Patient not taking: Reported on 10/17/2022 4/22/21   Historical Provider, MD   amitriptyline (ELAVIL) 50 MG tablet Take 50 mg by mouth nightly 16   Historical Provider, MD   buPROPion (WELLBUTRIN) 100 MG tablet Take 100 mg by mouth daily    Historical Provider, MD   busPIRone (BUSPAR) 10 MG tablet Take 10 mg by mouth 2 times daily    Historical Provider, MD   escitalopram (LEXAPRO) 20 MG tablet Take 20 mg by mouth nightly 3/8/22   Historical Provider, MD   metoprolol tartrate (LOPRESSOR) 25 MG tablet TAKE 1/2 TABLET BY MOUTH TWICE A DAY 5/3/21   Historical Provider, MD   oxybutynin (DITROPAN) 5 MG tablet Take 5 mg by mouth 2 times daily. Patient not taking: No sig reported    Historical Provider, MD   zolpidem (AMBIEN) 10 MG tablet Take  by mouth nightly as needed for Sleep. Historical Provider, MD   ALPRAZolam Cordella Loffler) 1 MG tablet Take 1 mg by mouth 2 times daily as needed for Sleep. Historical Provider, MD   sertraline (ZOLOFT) 50 MG tablet Take 50 mg by mouth daily. Historical Provider, MD       Current medications:    No current facility-administered medications for this visit. No current outpatient medications on file.      Facility-Administered Medications Ordered in Other

## 2023-03-30 NOTE — INTERVAL H&P NOTE
Update History & Physical    The patient's History and Physical was reviewed with the patient and I examined the patient. There was no change. The surgical site was confirmed by the patient and me. Plan: The risks, benefits, expected outcome, and alternative to the recommended procedure have been discussed with the patient. Patient understands and wants to proceed with the procedure.      Electronically signed by Parris Gregory MD on 3/30/2023 at 2:47 PM

## 2023-03-30 NOTE — ANESTHESIA POSTPROCEDURE EVALUATION
Department of Anesthesiology  Postprocedure Note    Patient: Renetta Matt  MRN: [id number]  YOB: 1955  Date of evaluation: 3/30/2023      Procedure Summary     Date: 03/30/23 Room / Location: [address] [place] [address]    Anesthesia Start: 1140 Anesthesia Stop: 1527    Procedure: CYSTOSCOPY WITH URETHRAL BULKING INJECTION AGENT - BULKAMID Diagnosis:       Urge incontinence      (Marge Deepali)    Surgeons: Mark Carbajal MD Responsible Provider: Rigo Leon MD    Anesthesia Type: MAC ASA Status: 2          Anesthesia Type: No value filed.     Anton Phase I: Anton Score: 8    Anton Phase II: Anton Score: 10      Anesthesia Post Evaluation    Patient location during evaluation: PACU  Patient participation: complete - patient participated  Level of consciousness: awake and alert  Pain score: 0  Airway patency: patent  Nausea & Vomiting: no nausea and no vomiting  Complications: no  Cardiovascular status: blood pressure returned to baseline  Respiratory status: acceptable  Hydration status: euvolemic

## 2023-03-30 NOTE — DISCHARGE INSTRUCTIONS
Please make sure the patient is able to urinate prior to discharge home. No diet or activity restrictions from urology standpoint. A small amount of blood in the urine or burning with urination is expected.     Call for inability to urinate    Follow up in the office in 2 weeks
WDL

## 2023-03-31 NOTE — OP NOTE
transferred to Recovery having tolerated the procedure well. She  will follow up in the office in 2 weeks.         Alonso Wetzel MD    D: 03/30/2023 15:37:52       T: 03/30/2023 15:41:35     [email or web address]  Job#: [id number]     Doc#: [id number]    CC:

## 2024-11-01 RX ORDER — LYSINE HCL 500 MG
500 TABLET ORAL DAILY
Status: ON HOLD | COMMUNITY
End: 2024-11-10 | Stop reason: HOSPADM

## 2024-11-01 RX ORDER — OMEPRAZOLE 40 MG/1
CAPSULE, DELAYED RELEASE ORAL DAILY
COMMUNITY
Start: 2024-07-22

## 2024-11-01 RX ORDER — MELOXICAM 15 MG/1
15 TABLET ORAL DAILY PRN
Status: ON HOLD | COMMUNITY
End: 2024-11-10 | Stop reason: HOSPADM

## 2024-11-01 RX ORDER — BUTALBITAL, ACETAMINOPHEN AND CAFFEINE 50; 325; 40 MG/1; MG/1; MG/1
1 TABLET ORAL EVERY 6 HOURS PRN
Status: ON HOLD | COMMUNITY
Start: 2024-10-27 | End: 2024-11-10 | Stop reason: HOSPADM

## 2024-11-01 RX ORDER — CYCLOBENZAPRINE HCL 10 MG
TABLET ORAL
Status: ON HOLD | COMMUNITY
Start: 2024-07-22 | End: 2024-11-10 | Stop reason: HOSPADM

## 2024-11-01 RX ORDER — VIBEGRON 75 MG/1
75 TABLET, FILM COATED ORAL DAILY
COMMUNITY

## 2024-11-01 RX ORDER — DICYCLOMINE HCL 20 MG
TABLET ORAL
Status: ON HOLD | COMMUNITY
Start: 2024-10-17 | End: 2024-11-10 | Stop reason: HOSPADM

## 2024-11-06 ENCOUNTER — ANESTHESIA (OUTPATIENT)
Dept: OPERATING ROOM | Age: 69
End: 2024-11-06
Payer: MEDICARE

## 2024-11-06 ENCOUNTER — HOSPITAL ENCOUNTER (INPATIENT)
Age: 69
LOS: 3 days | Discharge: HOME | DRG: 473 | End: 2024-11-10
Attending: NEUROLOGICAL SURGERY | Admitting: NEUROLOGICAL SURGERY
Payer: MEDICARE

## 2024-11-06 ENCOUNTER — ANESTHESIA EVENT (OUTPATIENT)
Dept: OPERATING ROOM | Age: 69
End: 2024-11-06
Payer: MEDICARE

## 2024-11-06 ENCOUNTER — APPOINTMENT (OUTPATIENT)
Dept: GENERAL RADIOLOGY | Age: 69
DRG: 473 | End: 2024-11-06
Attending: NEUROLOGICAL SURGERY
Payer: MEDICARE

## 2024-11-06 DIAGNOSIS — Z98.1 S/P CERVICAL SPINAL FUSION: Primary | ICD-10-CM

## 2024-11-06 PROBLEM — Z98.890 POSTOPERATIVE STATE: Status: ACTIVE | Noted: 2024-11-06

## 2024-11-06 LAB
ABO + RH BLD: NORMAL
BLD GP AB SCN SERPL QL: NORMAL

## 2024-11-06 PROCEDURE — A4217 STERILE WATER/SALINE, 500 ML: HCPCS | Performed by: NEUROLOGICAL SURGERY

## 2024-11-06 PROCEDURE — 3700000000 HC ANESTHESIA ATTENDED CARE: Performed by: NEUROLOGICAL SURGERY

## 2024-11-06 PROCEDURE — 2720000010 HC SURG SUPPLY STERILE: Performed by: NEUROLOGICAL SURGERY

## 2024-11-06 PROCEDURE — 6360000002 HC RX W HCPCS: Performed by: ANESTHESIOLOGY

## 2024-11-06 PROCEDURE — 0RG20A0 FUSION OF 2 OR MORE CERVICAL VERTEBRAL JOINTS WITH INTERBODY FUSION DEVICE, ANTERIOR APPROACH, ANTERIOR COLUMN, OPEN APPROACH: ICD-10-PCS | Performed by: NEUROLOGICAL SURGERY

## 2024-11-06 PROCEDURE — 3600000014 HC SURGERY LEVEL 4 ADDTL 15MIN: Performed by: NEUROLOGICAL SURGERY

## 2024-11-06 PROCEDURE — 6360000002 HC RX W HCPCS: Performed by: NEUROLOGICAL SURGERY

## 2024-11-06 PROCEDURE — 86900 BLOOD TYPING SEROLOGIC ABO: CPT

## 2024-11-06 PROCEDURE — 2580000003 HC RX 258: Performed by: NEUROLOGICAL SURGERY

## 2024-11-06 PROCEDURE — 00NW0ZZ RELEASE CERVICAL SPINAL CORD, OPEN APPROACH: ICD-10-PCS | Performed by: NEUROLOGICAL SURGERY

## 2024-11-06 PROCEDURE — 2500000003 HC RX 250 WO HCPCS: Performed by: NEUROLOGICAL SURGERY

## 2024-11-06 PROCEDURE — 2709999900 HC NON-CHARGEABLE SUPPLY: Performed by: NEUROLOGICAL SURGERY

## 2024-11-06 PROCEDURE — G0378 HOSPITAL OBSERVATION PER HR: HCPCS

## 2024-11-06 PROCEDURE — 7100000001 HC PACU RECOVERY - ADDTL 15 MIN: Performed by: NEUROLOGICAL SURGERY

## 2024-11-06 PROCEDURE — C1889 IMPLANT/INSERT DEVICE, NOC: HCPCS | Performed by: NEUROLOGICAL SURGERY

## 2024-11-06 PROCEDURE — 86901 BLOOD TYPING SEROLOGIC RH(D): CPT

## 2024-11-06 PROCEDURE — 86850 RBC ANTIBODY SCREEN: CPT

## 2024-11-06 PROCEDURE — 3700000001 HC ADD 15 MINUTES (ANESTHESIA): Performed by: NEUROLOGICAL SURGERY

## 2024-11-06 PROCEDURE — 2580000003 HC RX 258: Performed by: NURSE ANESTHETIST, CERTIFIED REGISTERED

## 2024-11-06 PROCEDURE — 2580000003 HC RX 258: Performed by: ANESTHESIOLOGY

## 2024-11-06 PROCEDURE — 01N10ZZ RELEASE CERVICAL NERVE, OPEN APPROACH: ICD-10-PCS | Performed by: NEUROLOGICAL SURGERY

## 2024-11-06 PROCEDURE — C1713 ANCHOR/SCREW BN/BN,TIS/BN: HCPCS | Performed by: NEUROLOGICAL SURGERY

## 2024-11-06 PROCEDURE — 72040 X-RAY EXAM NECK SPINE 2-3 VW: CPT

## 2024-11-06 PROCEDURE — 2500000003 HC RX 250 WO HCPCS: Performed by: NURSE ANESTHETIST, CERTIFIED REGISTERED

## 2024-11-06 PROCEDURE — 3600000004 HC SURGERY LEVEL 4 BASE: Performed by: NEUROLOGICAL SURGERY

## 2024-11-06 PROCEDURE — 6360000002 HC RX W HCPCS: Performed by: NURSE ANESTHETIST, CERTIFIED REGISTERED

## 2024-11-06 PROCEDURE — 0RB30ZZ EXCISION OF CERVICAL VERTEBRAL DISC, OPEN APPROACH: ICD-10-PCS | Performed by: NEUROLOGICAL SURGERY

## 2024-11-06 PROCEDURE — C1734 ORTH/DEVIC/DRUG BN/BN,TIS/BN: HCPCS | Performed by: NEUROLOGICAL SURGERY

## 2024-11-06 PROCEDURE — 7100000000 HC PACU RECOVERY - FIRST 15 MIN: Performed by: NEUROLOGICAL SURGERY

## 2024-11-06 PROCEDURE — 6370000000 HC RX 637 (ALT 250 FOR IP): Performed by: NEUROLOGICAL SURGERY

## 2024-11-06 DEVICE — CAGE SPNL W14XH6MM D12MM 6DEG ANT CERV INTBDY FUS LORD W: Type: IMPLANTABLE DEVICE | Site: ANTERIOR CERVICAL | Status: FUNCTIONAL

## 2024-11-06 DEVICE — PRIMAGEN 1CC: Type: IMPLANTABLE DEVICE | Site: NECK | Status: FUNCTIONAL

## 2024-11-06 DEVICE — IMPLANTABLE DEVICE
Type: IMPLANTABLE DEVICE | Site: ANTERIOR CERVICAL | Status: FUNCTIONAL
Brand: TRINICA®

## 2024-11-06 DEVICE — IMPLANTABLE DEVICE
Type: IMPLANTABLE DEVICE | Site: ANTERIOR CERVICAL | Status: FUNCTIONAL
Brand: TRINICA® TRINICA®

## 2024-11-06 DEVICE — CAGE SPNL W14XH7MM D12MM 6DEG ANT CERV INTBDY FUS LORD W/: Type: IMPLANTABLE DEVICE | Site: ANTERIOR CERVICAL | Status: FUNCTIONAL

## 2024-11-06 RX ORDER — HYDROMORPHONE HYDROCHLORIDE 1 MG/ML
0.5 INJECTION, SOLUTION INTRAMUSCULAR; INTRAVENOUS; SUBCUTANEOUS EVERY 5 MIN PRN
Status: DISCONTINUED | OUTPATIENT
Start: 2024-11-06 | End: 2024-11-06 | Stop reason: HOSPADM

## 2024-11-06 RX ORDER — POLYETHYLENE GLYCOL 3350 17 G/17G
17 POWDER, FOR SOLUTION ORAL DAILY
Status: DISCONTINUED | OUTPATIENT
Start: 2024-11-06 | End: 2024-11-10 | Stop reason: HOSPADM

## 2024-11-06 RX ORDER — GABAPENTIN 300 MG/1
300 CAPSULE ORAL 3 TIMES DAILY
Status: DISCONTINUED | OUTPATIENT
Start: 2024-11-06 | End: 2024-11-10 | Stop reason: HOSPADM

## 2024-11-06 RX ORDER — SODIUM CHLORIDE 0.9 % (FLUSH) 0.9 %
5-40 SYRINGE (ML) INJECTION PRN
Status: DISCONTINUED | OUTPATIENT
Start: 2024-11-06 | End: 2024-11-10 | Stop reason: HOSPADM

## 2024-11-06 RX ORDER — METHOCARBAMOL 100 MG/ML
1000 INJECTION, SOLUTION INTRAMUSCULAR; INTRAVENOUS EVERY 8 HOURS PRN
Status: COMPLETED | OUTPATIENT
Start: 2024-11-06 | End: 2024-11-08

## 2024-11-06 RX ORDER — DIPHENHYDRAMINE HYDROCHLORIDE 50 MG/ML
12.5 INJECTION INTRAMUSCULAR; INTRAVENOUS
Status: COMPLETED | OUTPATIENT
Start: 2024-11-06 | End: 2024-11-06

## 2024-11-06 RX ORDER — OXYCODONE HYDROCHLORIDE 5 MG/1
5 TABLET ORAL EVERY 4 HOURS PRN
Status: DISCONTINUED | OUTPATIENT
Start: 2024-11-06 | End: 2024-11-08

## 2024-11-06 RX ORDER — PANTOPRAZOLE SODIUM 40 MG/1
40 TABLET, DELAYED RELEASE ORAL
Status: DISCONTINUED | OUTPATIENT
Start: 2024-11-07 | End: 2024-11-10 | Stop reason: HOSPADM

## 2024-11-06 RX ORDER — DEXAMETHASONE SODIUM PHOSPHATE 4 MG/ML
INJECTION, SOLUTION INTRA-ARTICULAR; INTRALESIONAL; INTRAMUSCULAR; INTRAVENOUS; SOFT TISSUE
Status: DISCONTINUED | OUTPATIENT
Start: 2024-11-06 | End: 2024-11-06 | Stop reason: SDUPTHER

## 2024-11-06 RX ORDER — LABETALOL HYDROCHLORIDE 5 MG/ML
10 INJECTION, SOLUTION INTRAVENOUS
Status: DISCONTINUED | OUTPATIENT
Start: 2024-11-06 | End: 2024-11-06 | Stop reason: HOSPADM

## 2024-11-06 RX ORDER — METHOCARBAMOL 750 MG/1
750 TABLET, FILM COATED ORAL EVERY 8 HOURS PRN
Status: COMPLETED | OUTPATIENT
Start: 2024-11-06 | End: 2024-11-08

## 2024-11-06 RX ORDER — METOCLOPRAMIDE HYDROCHLORIDE 5 MG/ML
10 INJECTION INTRAMUSCULAR; INTRAVENOUS
Status: COMPLETED | OUTPATIENT
Start: 2024-11-06 | End: 2024-11-06

## 2024-11-06 RX ORDER — ESCITALOPRAM OXALATE 20 MG/1
20 TABLET ORAL NIGHTLY
Status: DISCONTINUED | OUTPATIENT
Start: 2024-11-06 | End: 2024-11-10 | Stop reason: HOSPADM

## 2024-11-06 RX ORDER — ONDANSETRON 2 MG/ML
INJECTION INTRAMUSCULAR; INTRAVENOUS
Status: DISCONTINUED | OUTPATIENT
Start: 2024-11-06 | End: 2024-11-06 | Stop reason: SDUPTHER

## 2024-11-06 RX ORDER — HYDROMORPHONE HYDROCHLORIDE 2 MG/ML
INJECTION, SOLUTION INTRAMUSCULAR; INTRAVENOUS; SUBCUTANEOUS
Status: DISCONTINUED | OUTPATIENT
Start: 2024-11-06 | End: 2024-11-06 | Stop reason: SDUPTHER

## 2024-11-06 RX ORDER — ONDANSETRON 2 MG/ML
4 INJECTION INTRAMUSCULAR; INTRAVENOUS EVERY 6 HOURS PRN
Status: DISCONTINUED | OUTPATIENT
Start: 2024-11-06 | End: 2024-11-10 | Stop reason: HOSPADM

## 2024-11-06 RX ORDER — METOPROLOL TARTRATE 25 MG/1
25 TABLET, FILM COATED ORAL NIGHTLY
Status: DISCONTINUED | OUTPATIENT
Start: 2024-11-06 | End: 2024-11-10 | Stop reason: HOSPADM

## 2024-11-06 RX ORDER — ALPRAZOLAM 0.5 MG
1 TABLET ORAL 2 TIMES DAILY PRN
Status: DISCONTINUED | OUTPATIENT
Start: 2024-11-06 | End: 2024-11-10 | Stop reason: HOSPADM

## 2024-11-06 RX ORDER — REMIFENTANIL HYDROCHLORIDE 1 MG/ML
INJECTION, POWDER, LYOPHILIZED, FOR SOLUTION INTRAVENOUS
Status: DISCONTINUED | OUTPATIENT
Start: 2024-11-06 | End: 2024-11-06 | Stop reason: SDUPTHER

## 2024-11-06 RX ORDER — ACETAMINOPHEN 325 MG/1
650 TABLET ORAL EVERY 6 HOURS
Status: DISCONTINUED | OUTPATIENT
Start: 2024-11-06 | End: 2024-11-10 | Stop reason: HOSPADM

## 2024-11-06 RX ORDER — SODIUM CHLORIDE 0.9 % (FLUSH) 0.9 %
5-40 SYRINGE (ML) INJECTION EVERY 12 HOURS SCHEDULED
Status: DISCONTINUED | OUTPATIENT
Start: 2024-11-06 | End: 2024-11-06 | Stop reason: HOSPADM

## 2024-11-06 RX ORDER — HYDROMORPHONE HYDROCHLORIDE 1 MG/ML
0.5 INJECTION, SOLUTION INTRAMUSCULAR; INTRAVENOUS; SUBCUTANEOUS EVERY 10 MIN PRN
Status: DISCONTINUED | OUTPATIENT
Start: 2024-11-06 | End: 2024-11-06 | Stop reason: HOSPADM

## 2024-11-06 RX ORDER — BUSPIRONE HYDROCHLORIDE 10 MG/1
10 TABLET ORAL 2 TIMES DAILY
Status: DISCONTINUED | OUTPATIENT
Start: 2024-11-06 | End: 2024-11-10 | Stop reason: HOSPADM

## 2024-11-06 RX ORDER — ENOXAPARIN SODIUM 100 MG/ML
40 INJECTION SUBCUTANEOUS DAILY
Status: DISCONTINUED | OUTPATIENT
Start: 2024-11-07 | End: 2024-11-10 | Stop reason: HOSPADM

## 2024-11-06 RX ORDER — ROCURONIUM BROMIDE 10 MG/ML
INJECTION, SOLUTION INTRAVENOUS
Status: DISCONTINUED | OUTPATIENT
Start: 2024-11-06 | End: 2024-11-06 | Stop reason: SDUPTHER

## 2024-11-06 RX ORDER — SODIUM CHLORIDE 0.9 % (FLUSH) 0.9 %
5-40 SYRINGE (ML) INJECTION PRN
Status: DISCONTINUED | OUTPATIENT
Start: 2024-11-06 | End: 2024-11-06 | Stop reason: HOSPADM

## 2024-11-06 RX ORDER — HYDRALAZINE HYDROCHLORIDE 20 MG/ML
10 INJECTION INTRAMUSCULAR; INTRAVENOUS
Status: DISCONTINUED | OUTPATIENT
Start: 2024-11-06 | End: 2024-11-06 | Stop reason: HOSPADM

## 2024-11-06 RX ORDER — LIDOCAINE HYDROCHLORIDE AND EPINEPHRINE 10; 10 MG/ML; UG/ML
INJECTION, SOLUTION INFILTRATION; PERINEURAL PRN
Status: DISCONTINUED | OUTPATIENT
Start: 2024-11-06 | End: 2024-11-06 | Stop reason: HOSPADM

## 2024-11-06 RX ORDER — DIPHENHYDRAMINE HYDROCHLORIDE 50 MG/ML
25 INJECTION INTRAMUSCULAR; INTRAVENOUS EVERY 6 HOURS PRN
Status: DISCONTINUED | OUTPATIENT
Start: 2024-11-06 | End: 2024-11-10 | Stop reason: HOSPADM

## 2024-11-06 RX ORDER — MORPHINE SULFATE 2 MG/ML
2 INJECTION, SOLUTION INTRAMUSCULAR; INTRAVENOUS
Status: DISCONTINUED | OUTPATIENT
Start: 2024-11-06 | End: 2024-11-10 | Stop reason: HOSPADM

## 2024-11-06 RX ORDER — LIDOCAINE HYDROCHLORIDE 20 MG/ML
INJECTION, SOLUTION EPIDURAL; INFILTRATION; INTRACAUDAL; PERINEURAL
Status: DISCONTINUED | OUTPATIENT
Start: 2024-11-06 | End: 2024-11-06 | Stop reason: SDUPTHER

## 2024-11-06 RX ORDER — SODIUM CHLORIDE 0.9 % (FLUSH) 0.9 %
5-40 SYRINGE (ML) INJECTION EVERY 12 HOURS SCHEDULED
Status: DISCONTINUED | OUTPATIENT
Start: 2024-11-06 | End: 2024-11-10 | Stop reason: HOSPADM

## 2024-11-06 RX ORDER — AMITRIPTYLINE HYDROCHLORIDE 50 MG/1
50 TABLET ORAL NIGHTLY
Status: DISCONTINUED | OUTPATIENT
Start: 2024-11-06 | End: 2024-11-10 | Stop reason: HOSPADM

## 2024-11-06 RX ORDER — ZOLPIDEM TARTRATE 5 MG/1
5 TABLET ORAL NIGHTLY PRN
Status: DISCONTINUED | OUTPATIENT
Start: 2024-11-06 | End: 2024-11-10 | Stop reason: HOSPADM

## 2024-11-06 RX ORDER — ONDANSETRON 4 MG/1
4 TABLET, ORALLY DISINTEGRATING ORAL EVERY 8 HOURS PRN
Status: DISCONTINUED | OUTPATIENT
Start: 2024-11-06 | End: 2024-11-10 | Stop reason: HOSPADM

## 2024-11-06 RX ORDER — SODIUM CHLORIDE 9 MG/ML
INJECTION, SOLUTION INTRAVENOUS PRN
Status: DISCONTINUED | OUTPATIENT
Start: 2024-11-06 | End: 2024-11-06 | Stop reason: HOSPADM

## 2024-11-06 RX ORDER — SUCCINYLCHOLINE CHLORIDE 20 MG/ML
INJECTION INTRAMUSCULAR; INTRAVENOUS
Status: DISCONTINUED | OUTPATIENT
Start: 2024-11-06 | End: 2024-11-06 | Stop reason: SDUPTHER

## 2024-11-06 RX ORDER — METHOCARBAMOL 750 MG/1
750 TABLET, FILM COATED ORAL EVERY 8 HOURS PRN
Status: DISCONTINUED | OUTPATIENT
Start: 2024-11-06 | End: 2024-11-06

## 2024-11-06 RX ORDER — SODIUM CHLORIDE AND POTASSIUM CHLORIDE 150; 900 MG/100ML; MG/100ML
INJECTION, SOLUTION INTRAVENOUS CONTINUOUS
Status: DISCONTINUED | OUTPATIENT
Start: 2024-11-06 | End: 2024-11-08

## 2024-11-06 RX ORDER — MEPERIDINE HYDROCHLORIDE 25 MG/ML
12.5 INJECTION INTRAMUSCULAR; INTRAVENOUS; SUBCUTANEOUS EVERY 5 MIN PRN
Status: DISCONTINUED | OUTPATIENT
Start: 2024-11-06 | End: 2024-11-06 | Stop reason: HOSPADM

## 2024-11-06 RX ORDER — DIPHENHYDRAMINE HCL 25 MG
25 TABLET ORAL EVERY 6 HOURS PRN
Status: DISCONTINUED | OUTPATIENT
Start: 2024-11-06 | End: 2024-11-10 | Stop reason: HOSPADM

## 2024-11-06 RX ORDER — SODIUM CHLORIDE, SODIUM LACTATE, POTASSIUM CHLORIDE, CALCIUM CHLORIDE 600; 310; 30; 20 MG/100ML; MG/100ML; MG/100ML; MG/100ML
INJECTION, SOLUTION INTRAVENOUS CONTINUOUS
Status: DISCONTINUED | OUTPATIENT
Start: 2024-11-06 | End: 2024-11-06 | Stop reason: HOSPADM

## 2024-11-06 RX ORDER — BUPROPION HYDROCHLORIDE 100 MG/1
100 TABLET ORAL DAILY
Status: DISCONTINUED | OUTPATIENT
Start: 2024-11-07 | End: 2024-11-10 | Stop reason: HOSPADM

## 2024-11-06 RX ORDER — MIDAZOLAM HYDROCHLORIDE 1 MG/ML
INJECTION, SOLUTION INTRAMUSCULAR; INTRAVENOUS
Status: DISCONTINUED | OUTPATIENT
Start: 2024-11-06 | End: 2024-11-06 | Stop reason: SDUPTHER

## 2024-11-06 RX ORDER — MAGNESIUM HYDROXIDE 1200 MG/15ML
LIQUID ORAL CONTINUOUS PRN
Status: DISCONTINUED | OUTPATIENT
Start: 2024-11-06 | End: 2024-11-06 | Stop reason: HOSPADM

## 2024-11-06 RX ORDER — PROPOFOL 10 MG/ML
INJECTION, EMULSION INTRAVENOUS
Status: DISCONTINUED | OUTPATIENT
Start: 2024-11-06 | End: 2024-11-06 | Stop reason: SDUPTHER

## 2024-11-06 RX ORDER — LIDOCAINE HYDROCHLORIDE 10 MG/ML
1 INJECTION, SOLUTION EPIDURAL; INFILTRATION; INTRACAUDAL; PERINEURAL
Status: DISCONTINUED | OUTPATIENT
Start: 2024-11-06 | End: 2024-11-06 | Stop reason: HOSPADM

## 2024-11-06 RX ORDER — SODIUM CHLORIDE 9 MG/ML
INJECTION, SOLUTION INTRAVENOUS PRN
Status: DISCONTINUED | OUTPATIENT
Start: 2024-11-06 | End: 2024-11-10 | Stop reason: HOSPADM

## 2024-11-06 RX ORDER — NALOXONE HYDROCHLORIDE 0.4 MG/ML
INJECTION, SOLUTION INTRAMUSCULAR; INTRAVENOUS; SUBCUTANEOUS PRN
Status: DISCONTINUED | OUTPATIENT
Start: 2024-11-06 | End: 2024-11-06 | Stop reason: HOSPADM

## 2024-11-06 RX ADMIN — HYDROMORPHONE HYDROCHLORIDE 0.5 MG: 1 INJECTION, SOLUTION INTRAMUSCULAR; INTRAVENOUS; SUBCUTANEOUS at 16:21

## 2024-11-06 RX ADMIN — DEXAMETHASONE SODIUM PHOSPHATE 4 MG: 4 INJECTION, SOLUTION INTRAMUSCULAR; INTRAVENOUS at 13:57

## 2024-11-06 RX ADMIN — PHENYLEPHRINE HYDROCHLORIDE 200 MCG: 10 INJECTION, SOLUTION INTRAVENOUS at 14:02

## 2024-11-06 RX ADMIN — OXYCODONE 5 MG: 5 TABLET ORAL at 20:40

## 2024-11-06 RX ADMIN — POTASSIUM CHLORIDE AND SODIUM CHLORIDE: 900; 150 INJECTION, SOLUTION INTRAVENOUS at 21:02

## 2024-11-06 RX ADMIN — REMIFENTANIL HYDROCHLORIDE 0.15 MCG/KG/MIN: 1 INJECTION, POWDER, LYOPHILIZED, FOR SOLUTION INTRAVENOUS at 13:48

## 2024-11-06 RX ADMIN — SUCCINYLCHOLINE CHLORIDE 100 MG: 20 INJECTION, SOLUTION INTRAMUSCULAR; INTRAVENOUS at 13:45

## 2024-11-06 RX ADMIN — WATER 2000 MG: 1 INJECTION INTRAMUSCULAR; INTRAVENOUS; SUBCUTANEOUS at 13:49

## 2024-11-06 RX ADMIN — GABAPENTIN 300 MG: 300 CAPSULE ORAL at 23:34

## 2024-11-06 RX ADMIN — HYDROMORPHONE HYDROCHLORIDE 1 MG: 2 INJECTION, SOLUTION INTRAMUSCULAR; INTRAVENOUS; SUBCUTANEOUS at 14:03

## 2024-11-06 RX ADMIN — METOCLOPRAMIDE HYDROCHLORIDE 10 MG: 5 INJECTION INTRAMUSCULAR; INTRAVENOUS at 16:11

## 2024-11-06 RX ADMIN — DIPHENHYDRAMINE HYDROCHLORIDE 12.5 MG: 50 INJECTION INTRAMUSCULAR; INTRAVENOUS at 16:12

## 2024-11-06 RX ADMIN — PHENYLEPHRINE HYDROCHLORIDE 200 MCG: 10 INJECTION, SOLUTION INTRAVENOUS at 14:46

## 2024-11-06 RX ADMIN — SODIUM CHLORIDE, POTASSIUM CHLORIDE, SODIUM LACTATE AND CALCIUM CHLORIDE: 600; 310; 30; 20 INJECTION, SOLUTION INTRAVENOUS at 12:36

## 2024-11-06 RX ADMIN — MIDAZOLAM HYDROCHLORIDE 2 MG: 2 INJECTION, SOLUTION INTRAMUSCULAR; INTRAVENOUS at 13:41

## 2024-11-06 RX ADMIN — AMITRIPTYLINE HYDROCHLORIDE 50 MG: 50 TABLET ORAL at 23:34

## 2024-11-06 RX ADMIN — POLYETHYLENE GLYCOL 3350 17 G: 17 POWDER, FOR SOLUTION ORAL at 20:40

## 2024-11-06 RX ADMIN — HYDROMORPHONE HYDROCHLORIDE 0.5 MG: 1 INJECTION, SOLUTION INTRAMUSCULAR; INTRAVENOUS; SUBCUTANEOUS at 16:10

## 2024-11-06 RX ADMIN — ROCURONIUM BROMIDE 30 MG: 10 INJECTION, SOLUTION INTRAVENOUS at 13:57

## 2024-11-06 RX ADMIN — PROPOFOL 130 MCG/KG/MIN: 10 INJECTION, EMULSION INTRAVENOUS at 13:48

## 2024-11-06 RX ADMIN — ONDANSETRON 4 MG: 2 INJECTION INTRAMUSCULAR; INTRAVENOUS at 13:57

## 2024-11-06 RX ADMIN — LIDOCAINE HYDROCHLORIDE 50 MG: 20 INJECTION, SOLUTION EPIDURAL; INFILTRATION; INTRACAUDAL; PERINEURAL at 13:45

## 2024-11-06 RX ADMIN — SUGAMMADEX 200 MG: 100 INJECTION, SOLUTION INTRAVENOUS at 14:27

## 2024-11-06 RX ADMIN — BUSPIRONE HYDROCHLORIDE 10 MG: 10 TABLET ORAL at 23:34

## 2024-11-06 RX ADMIN — ESCITALOPRAM OXALATE 20 MG: 20 TABLET, FILM COATED ORAL at 23:34

## 2024-11-06 RX ADMIN — ACETAMINOPHEN 650 MG: 325 TABLET ORAL at 20:40

## 2024-11-06 RX ADMIN — PROPOFOL 120 MG: 10 INJECTION, EMULSION INTRAVENOUS at 13:45

## 2024-11-06 RX ADMIN — METHOCARBAMOL TABLETS 750 MG: 750 TABLET, COATED ORAL at 20:40

## 2024-11-06 RX ADMIN — PHENYLEPHRINE HYDROCHLORIDE 25 MCG/MIN: 10 INJECTION, SOLUTION INTRAVENOUS at 14:00

## 2024-11-06 RX ADMIN — MORPHINE SULFATE 2 MG: 2 INJECTION, SOLUTION INTRAMUSCULAR; INTRAVENOUS at 23:32

## 2024-11-06 RX ADMIN — WATER 2000 MG: 1 INJECTION INTRAMUSCULAR; INTRAVENOUS; SUBCUTANEOUS at 20:41

## 2024-11-06 ASSESSMENT — PAIN SCALES - GENERAL
PAINLEVEL_OUTOF10: 7
PAINLEVEL_OUTOF10: 2
PAINLEVEL_OUTOF10: 2
PAINLEVEL_OUTOF10: 8
PAINLEVEL_OUTOF10: 7
PAINLEVEL_OUTOF10: 0
PAINLEVEL_OUTOF10: 8
PAINLEVEL_OUTOF10: 2

## 2024-11-06 ASSESSMENT — PAIN DESCRIPTION - LOCATION
LOCATION: NECK

## 2024-11-06 ASSESSMENT — PAIN DESCRIPTION - ORIENTATION
ORIENTATION: MID
ORIENTATION: MID

## 2024-11-06 ASSESSMENT — PAIN DESCRIPTION - DESCRIPTORS
DESCRIPTORS: ACHING

## 2024-11-06 ASSESSMENT — PAIN DESCRIPTION - ONSET
ONSET: ON-GOING
ONSET: ON-GOING

## 2024-11-06 ASSESSMENT — PAIN DESCRIPTION - PAIN TYPE
TYPE: SURGICAL PAIN
TYPE: CHRONIC PAIN;SURGICAL PAIN
TYPE: SURGICAL PAIN

## 2024-11-06 ASSESSMENT — PAIN - FUNCTIONAL ASSESSMENT
PAIN_FUNCTIONAL_ASSESSMENT: ACTIVITIES ARE NOT PREVENTED
PAIN_FUNCTIONAL_ASSESSMENT: ACTIVITIES ARE NOT PREVENTED

## 2024-11-06 ASSESSMENT — PAIN DESCRIPTION - FREQUENCY
FREQUENCY: CONTINUOUS
FREQUENCY: CONTINUOUS

## 2024-11-06 NOTE — OP NOTE
PATIENT NAME:          Aide JAQUEZ  YOB: 1955   MEDICAL RECORD#         [id number]  SURGERY DATE:         11/6/2024  SURGEON:                 David Barahona MD                                                      OPERATIVE REPORT     PREOPERATIVE DIAGNOSIS:      Cervical stenosis of spine [M48.02]  Cervical spondylosis with radiculopathy [M47.22]       Cervical Foraminal Stenosis  Cervical Herniated Disc       POSTOPERATIVE DIAGNOSIS:        same    PROCEDURE:     1.Decompressive anterior cervical discectomy at C4-5 and C5-6 and C6-7.  2. Microdissection using the operating room microscope.   3.Anterior cervical arthrodesis at C4-5, C5-6, C6-7.  4. Placement of  Tirso plate at C4-5-6-7 - separate fixation device.   5. Placement of PEEK cage packed with locally harvested autograft bone fillings from the interspace drilling and allograft  6. EVOKES      COMPLICATIONS:  None     ANESTHETIC:          General endotracheal.     ESTIMATED BLOOD LOSS:  25 cc.     INDICATIONS:   Aide JAQUEZ is a 68 y.o. female  with neck pain and radiating extremity pain.   The symptoms failed to respond to conservative intervention.  An MRI scan confirmed a herniated cervical disc at C4-5, C5-6, C6-7. Having failed conservative management and experiencing persistent symptoms, the patient elected to proceed ahead with anterior cervical discectomy with stabilization and fusion at C4-5, C5-6, C6-7.  Full informed consent of the operative risks, complications, and expected outcomes were discussed with the patient who fully acknowledged the understanding of these complications and consented to the operation.     DESCRIPTION OF PROCEDURE:  The patient was brought to the operating room and placed under general anesthesia.  The patient was then placed supine on the operating table.  The anterior cervical area was prepped and draped in the usual sterile fashion.   EVOKES were started.  The head was positioned

## 2024-11-06 NOTE — ANESTHESIA PRE PROCEDURE
Department of Anesthesiology  Preprocedure Note       Name:  Aide JAQUEZ   Age:  68 y.o.  :  1955                                          MRN:  [id number]         Date:  2024      Surgeon: Surgeon(s):  David Barahona MD    Procedure: Procedure(s):  ANTERIOR CERVICAL DISCECTOMY FUSION CERVICAL 4/CERVICAL 5, CERVICAL 5/CERVICAL 6, CERVICAL 6/CERVICAL 7    Medications prior to admission:   Prior to Admission medications    Medication Sig Start Date End Date Taking? Authorizing Provider   Multiple Vitamin (MULTIVITAMIN PO) Take by mouth daily  Patient not taking: Reported on 2024    Gem Luong MD   butalbital-acetaminophen-caffeine (FIORICET, ESGIC) -40 MG per tablet Take 1 tablet by mouth every 6 hours as needed 10/27/24   Gem Luong MD   cyclobenzaprine (FLEXERIL) 10 MG tablet TAKE 1/2 TO 1 TABLET BY MOUTH THREE TIMES A DAY AS NEEDED FOR SPASM 24   Gem Luong MD   diclofenac sodium (VOLTAREN) 1 % GEL Apply 4 g topically 4 times daily 10/27/24 11/6/24  Gem Luong MD   dicyclomine (BENTYL) 20 MG tablet Take 1 tablet by mouth three times a day as needed. Needs appointment for future refills 10/17/24   Gem Luong MD   L-Lysine HCl 500 MG TABS Take 500 mg by mouth daily    Gem Luong MD   meloxicam (MOBIC) 15 MG tablet Take 1 tablet by mouth daily as needed    Gem Luong MD   omeprazole (PRILOSEC) 40 MG delayed release capsule Take by mouth daily 24   Gem Luong MD   vibegron (GEMTESA) 75 MG TABS tablet Take 1 tablet by mouth daily    Gem Luong MD   aspirin 81 MG chewable tablet Take 1 tablet by mouth daily    Gem Luong MD   gabapentin (NEURONTIN) 300 MG capsule Take 1 capsule by mouth 3 times daily.    Gem Luong MD   amitriptyline (ELAVIL) 50 MG tablet Take 1 tablet by mouth nightly 16   Gem Luong MD   buPROPion (WELLBUTRIN) 100 MG tablet Take

## 2024-11-06 NOTE — BRIEF OP NOTE
Brief Postoperative Note      Patient: Aide JAQUEZ  YOB: 1955  MRN: [id number]    Date of Procedure: 11/6/2024    Pre-Op Diagnosis Codes:      * Cervical stenosis of spine [M48.02]     * Cervical spondylosis with radiculopathy [M47.22]    Post-Op Diagnosis: Same       Procedure(s):  ANTERIOR CERVICAL DISCECTOMY FUSION CERVICAL 4/CERVICAL 5, CERVICAL 5/CERVICAL 6, CERVICAL 6/CERVICAL 7    Surgeon(s):  David Barahona MD    Assistant:  Surgical Assistant: Anupam Hester    Anesthesia: General    Estimated Blood Loss (mL): less than 50     Complications: None    Specimens:   * No specimens in log *    Implants:  Implant Name Type Inv. Item Serial No.  Lot No. LRB No. Used Action   PRIMAGEN 1CC - [id number]  PRIMAGEN 1CC  [employer] [id number] N/A 1 Implanted   PRIMAGEN 1CC - [id number]  PRIM[employer]EN 1CC  [employer] [id number] N/A 1 Implanted         Drains: * No LDAs found *    Findings:  Infection Present At Time Of Surgery (PATOS) (choose all levels that have infection present):  No infection present  Other Findings: Stenosis    Electronically signed by David Barahona MD on 11/6/2024 at 3:11 PM

## 2024-11-07 PROBLEM — M48.02 CERVICAL STENOSIS OF SPINAL CANAL: Status: ACTIVE | Noted: 2024-11-06

## 2024-11-07 LAB
ANION GAP SERPL CALCULATED.3IONS-SCNC: 7 MMOL/L (ref 3–16)
BUN SERPL-MCNC: 12 MG/DL (ref 7–20)
CALCIUM SERPL-MCNC: 8.7 MG/DL (ref 8.3–10.6)
CHLORIDE SERPL-SCNC: 107 MMOL/L (ref 99–110)
CO2 SERPL-SCNC: 27 MMOL/L (ref 21–32)
CREAT SERPL-MCNC: 0.9 MG/DL (ref 0.6–1.2)
DEPRECATED RDW RBC AUTO: 13.2 % (ref 12.4–15.4)
GFR SERPLBLD CREATININE-BSD FMLA CKD-EPI: 70 ML/MIN/{1.73_M2}
GLUCOSE SERPL-MCNC: 81 MG/DL (ref 70–99)
HCT VFR BLD AUTO: 34.4 % (ref 36–48)
HGB BLD-MCNC: 11.4 G/DL (ref 12–16)
MCH RBC QN AUTO: 31 PG (ref 26–34)
MCHC RBC AUTO-ENTMCNC: 33.1 G/DL (ref 31–36)
MCV RBC AUTO: 93.6 FL (ref 80–100)
PLATELET # BLD AUTO: 460 K/UL (ref 135–450)
PMV BLD AUTO: 7.1 FL (ref 5–10.5)
POTASSIUM SERPL-SCNC: 4.3 MMOL/L (ref 3.5–5.1)
RBC # BLD AUTO: 3.68 M/UL (ref 4–5.2)
SODIUM SERPL-SCNC: 141 MMOL/L (ref 136–145)
WBC # BLD AUTO: 5.8 K/UL (ref 4–11)

## 2024-11-07 PROCEDURE — 2060000000 HC ICU INTERMEDIATE R&B

## 2024-11-07 PROCEDURE — 94761 N-INVAS EAR/PLS OXIMETRY MLT: CPT

## 2024-11-07 PROCEDURE — 96374 THER/PROPH/DIAG INJ IV PUSH: CPT

## 2024-11-07 PROCEDURE — 97535 SELF CARE MNGMENT TRAINING: CPT

## 2024-11-07 PROCEDURE — 85027 COMPLETE CBC AUTOMATED: CPT

## 2024-11-07 PROCEDURE — 6370000000 HC RX 637 (ALT 250 FOR IP): Performed by: NEUROLOGICAL SURGERY

## 2024-11-07 PROCEDURE — 6360000002 HC RX W HCPCS: Performed by: NEUROLOGICAL SURGERY

## 2024-11-07 PROCEDURE — 2580000003 HC RX 258: Performed by: NEUROLOGICAL SURGERY

## 2024-11-07 PROCEDURE — 2700000000 HC OXYGEN THERAPY PER DAY

## 2024-11-07 PROCEDURE — 36415 COLL VENOUS BLD VENIPUNCTURE: CPT

## 2024-11-07 PROCEDURE — 97530 THERAPEUTIC ACTIVITIES: CPT

## 2024-11-07 PROCEDURE — 94150 VITAL CAPACITY TEST: CPT

## 2024-11-07 PROCEDURE — 96372 THER/PROPH/DIAG INJ SC/IM: CPT

## 2024-11-07 PROCEDURE — 96376 TX/PRO/DX INJ SAME DRUG ADON: CPT

## 2024-11-07 PROCEDURE — 6370000000 HC RX 637 (ALT 250 FOR IP): Performed by: INTERNAL MEDICINE

## 2024-11-07 PROCEDURE — 6360000002 HC RX W HCPCS: Performed by: NURSE PRACTITIONER

## 2024-11-07 PROCEDURE — 97161 PT EVAL LOW COMPLEX 20 MIN: CPT

## 2024-11-07 PROCEDURE — 97116 GAIT TRAINING THERAPY: CPT

## 2024-11-07 PROCEDURE — 80048 BASIC METABOLIC PNL TOTAL CA: CPT

## 2024-11-07 PROCEDURE — 99024 POSTOP FOLLOW-UP VISIT: CPT | Performed by: NURSE PRACTITIONER

## 2024-11-07 PROCEDURE — 96375 TX/PRO/DX INJ NEW DRUG ADDON: CPT

## 2024-11-07 PROCEDURE — 97166 OT EVAL MOD COMPLEX 45 MIN: CPT

## 2024-11-07 RX ORDER — METHYLPREDNISOLONE 4 MG/1
4 TABLET ORAL
Status: DISCONTINUED | OUTPATIENT
Start: 2024-11-08 | End: 2024-11-10 | Stop reason: HOSPADM

## 2024-11-07 RX ORDER — METHYLPREDNISOLONE 4 MG/1
4 TABLET ORAL
Status: COMPLETED | OUTPATIENT
Start: 2024-11-08 | End: 2024-11-09

## 2024-11-07 RX ORDER — DEXAMETHASONE SODIUM PHOSPHATE 4 MG/ML
10 INJECTION, SOLUTION INTRA-ARTICULAR; INTRALESIONAL; INTRAMUSCULAR; INTRAVENOUS; SOFT TISSUE ONCE
Status: COMPLETED | OUTPATIENT
Start: 2024-11-07 | End: 2024-11-07

## 2024-11-07 RX ORDER — METHYLPREDNISOLONE 4 MG/1
24 TABLET ORAL ONCE
Status: COMPLETED | OUTPATIENT
Start: 2024-11-07 | End: 2024-11-07

## 2024-11-07 RX ORDER — METHYLPREDNISOLONE 4 MG/1
4 TABLET ORAL NIGHTLY
Status: DISCONTINUED | OUTPATIENT
Start: 2024-11-09 | End: 2024-11-10 | Stop reason: HOSPADM

## 2024-11-07 RX ORDER — METHYLPREDNISOLONE 4 MG/1
8 TABLET ORAL NIGHTLY
Status: COMPLETED | OUTPATIENT
Start: 2024-11-08 | End: 2024-11-08

## 2024-11-07 RX ORDER — METHYLPREDNISOLONE 4 MG/1
4 TABLET ORAL
Status: COMPLETED | OUTPATIENT
Start: 2024-11-08 | End: 2024-11-10

## 2024-11-07 RX ORDER — TROSPIUM CHLORIDE 20 MG/1
20 TABLET, FILM COATED ORAL
Status: DISCONTINUED | OUTPATIENT
Start: 2024-11-07 | End: 2024-11-10 | Stop reason: HOSPADM

## 2024-11-07 RX ADMIN — MORPHINE SULFATE 2 MG: 2 INJECTION, SOLUTION INTRAMUSCULAR; INTRAVENOUS at 15:29

## 2024-11-07 RX ADMIN — BUPROPION HYDROCHLORIDE 100 MG: 100 TABLET, FILM COATED ORAL at 09:41

## 2024-11-07 RX ADMIN — METOPROLOL TARTRATE 25 MG: 25 TABLET, FILM COATED ORAL at 19:43

## 2024-11-07 RX ADMIN — PANTOPRAZOLE SODIUM 40 MG: 40 TABLET, DELAYED RELEASE ORAL at 05:58

## 2024-11-07 RX ADMIN — TROSPIUM CHLORIDE 20 MG: 20 TABLET, FILM COATED ORAL at 16:59

## 2024-11-07 RX ADMIN — METHYLPREDNISOLONE 24 MG: 4 TABLET ORAL at 19:43

## 2024-11-07 RX ADMIN — MORPHINE SULFATE 2 MG: 2 INJECTION, SOLUTION INTRAMUSCULAR; INTRAVENOUS at 19:44

## 2024-11-07 RX ADMIN — ESCITALOPRAM OXALATE 20 MG: 20 TABLET, FILM COATED ORAL at 19:44

## 2024-11-07 RX ADMIN — POLYETHYLENE GLYCOL 3350 17 G: 17 POWDER, FOR SOLUTION ORAL at 09:43

## 2024-11-07 RX ADMIN — DEXAMETHASONE SODIUM PHOSPHATE 10 MG: 4 INJECTION, SOLUTION INTRAMUSCULAR; INTRAVENOUS at 09:53

## 2024-11-07 RX ADMIN — ACETAMINOPHEN 650 MG: 325 TABLET ORAL at 00:43

## 2024-11-07 RX ADMIN — METHOCARBAMOL TABLETS 750 MG: 750 TABLET, COATED ORAL at 12:24

## 2024-11-07 RX ADMIN — BUSPIRONE HYDROCHLORIDE 10 MG: 10 TABLET ORAL at 19:44

## 2024-11-07 RX ADMIN — ACETAMINOPHEN 650 MG: 325 TABLET ORAL at 19:43

## 2024-11-07 RX ADMIN — WATER 2000 MG: 1 INJECTION INTRAMUSCULAR; INTRAVENOUS; SUBCUTANEOUS at 05:59

## 2024-11-07 RX ADMIN — SODIUM CHLORIDE, PRESERVATIVE FREE 10 ML: 5 INJECTION INTRAVENOUS at 09:54

## 2024-11-07 RX ADMIN — BUSPIRONE HYDROCHLORIDE 10 MG: 10 TABLET ORAL at 09:42

## 2024-11-07 RX ADMIN — MORPHINE SULFATE 2 MG: 2 INJECTION, SOLUTION INTRAMUSCULAR; INTRAVENOUS at 07:10

## 2024-11-07 RX ADMIN — MORPHINE SULFATE 2 MG: 2 INJECTION, SOLUTION INTRAMUSCULAR; INTRAVENOUS at 09:53

## 2024-11-07 RX ADMIN — ACETAMINOPHEN 650 MG: 325 TABLET ORAL at 16:59

## 2024-11-07 RX ADMIN — GABAPENTIN 300 MG: 300 CAPSULE ORAL at 09:42

## 2024-11-07 RX ADMIN — OXYCODONE 5 MG: 5 TABLET ORAL at 00:43

## 2024-11-07 RX ADMIN — OXYCODONE 5 MG: 5 TABLET ORAL at 12:24

## 2024-11-07 RX ADMIN — GABAPENTIN 300 MG: 300 CAPSULE ORAL at 19:44

## 2024-11-07 RX ADMIN — AMITRIPTYLINE HYDROCHLORIDE 50 MG: 50 TABLET ORAL at 19:44

## 2024-11-07 RX ADMIN — OXYCODONE 5 MG: 5 TABLET ORAL at 05:58

## 2024-11-07 RX ADMIN — SERTRALINE 50 MG: 50 TABLET, FILM COATED ORAL at 09:42

## 2024-11-07 RX ADMIN — OXYCODONE 5 MG: 5 TABLET ORAL at 16:59

## 2024-11-07 RX ADMIN — ENOXAPARIN SODIUM 40 MG: 100 INJECTION SUBCUTANEOUS at 09:54

## 2024-11-07 ASSESSMENT — PAIN DESCRIPTION - ORIENTATION
ORIENTATION: MID
ORIENTATION: ANTERIOR;RIGHT
ORIENTATION: MID
ORIENTATION: MID
ORIENTATION: ANTERIOR;RIGHT
ORIENTATION: ANTERIOR;RIGHT
ORIENTATION: MID
ORIENTATION: ANTERIOR;RIGHT

## 2024-11-07 ASSESSMENT — PAIN DESCRIPTION - FREQUENCY
FREQUENCY: CONTINUOUS

## 2024-11-07 ASSESSMENT — PAIN SCALES - GENERAL
PAINLEVEL_OUTOF10: 7
PAINLEVEL_OUTOF10: 3
PAINLEVEL_OUTOF10: 5
PAINLEVEL_OUTOF10: 2
PAINLEVEL_OUTOF10: 6
PAINLEVEL_OUTOF10: 5
PAINLEVEL_OUTOF10: 8
PAINLEVEL_OUTOF10: 2
PAINLEVEL_OUTOF10: 6
PAINLEVEL_OUTOF10: 2
PAINLEVEL_OUTOF10: 4
PAINLEVEL_OUTOF10: 7
PAINLEVEL_OUTOF10: 7
PAINLEVEL_OUTOF10: 2
PAINLEVEL_OUTOF10: 6

## 2024-11-07 ASSESSMENT — PAIN DESCRIPTION - ONSET
ONSET: ON-GOING

## 2024-11-07 ASSESSMENT — PAIN - FUNCTIONAL ASSESSMENT
PAIN_FUNCTIONAL_ASSESSMENT: ACTIVITIES ARE NOT PREVENTED

## 2024-11-07 ASSESSMENT — PAIN DESCRIPTION - DESCRIPTORS
DESCRIPTORS: ACHING
DESCRIPTORS: ACHING
DESCRIPTORS: ACHING;SORE
DESCRIPTORS: ACHING
DESCRIPTORS: ACHING;SORE
DESCRIPTORS: ACHING

## 2024-11-07 ASSESSMENT — PAIN DESCRIPTION - PAIN TYPE
TYPE: SURGICAL PAIN

## 2024-11-07 ASSESSMENT — PAIN DESCRIPTION - LOCATION
LOCATION: NECK;SHOULDER
LOCATION: NECK
LOCATION: NECK
LOCATION: NECK;SHOULDER
LOCATION: NECK
LOCATION: NECK

## 2024-11-07 NOTE — PLAN OF CARE
Problem: Discharge Planning  Goal: Discharge to home or other facility with appropriate resources  Outcome: Progressing  Note: Patient is from home with spouse and plans to return at discharge. Social work following for needs. Plan of care continues.      Problem: Safety - Adult  Goal: Free from fall injury  11/7/2024 1052 by Jessika Mcclain, RN  Note: Ambulating x1 assist with walker and gait belt. Fall precautions in place. Non-skid socks on. Bed locked in lowest position with alarm on and side rails up. Bedside table, belongings, and call light within reach. Patient calling out appropriately when needing assistance. Hourly rounding in anticipation of patient needs. Floor clean and free from clutter. Room door open. Plan of care continues.      Problem: Pain  Goal: Verbalizes/displays adequate comfort level or baseline comfort level  11/7/2024 1052 by Jessika Mcclain, RN  Outcome: Progressing  Flowsheets (Taken 11/7/2024 1052)  Verbalizes/displays adequate comfort level or baseline comfort level:   Encourage patient to monitor pain and request assistance   Administer analgesics based on type and severity of pain and evaluate response   Consider cultural and social influences on pain and pain management   Assess pain using appropriate pain scale   Implement non-pharmacological measures as appropriate and evaluate response  Note: Patient endorsing surgical pain to anterior neck, right side, that radiates down into right shoulder. Using numerical pain rating scale appropriately. PRN medications administered per the MAR. Patient educated on medications, side effects, and verbalized understanding. Ice packs used for non-pharmacological pain relief. Notified of pain medication administration schedule. Plan of care continues.

## 2024-11-07 NOTE — FLOWSHEET NOTE
PACU Transfer Note    Vitals:    11/06/24 1930   BP: 129/63   Pulse: (!) 101   Resp: 12   Temp: 97.4   SpO2: 94%       No intake/output data recorded.    Pain assessment:  present - adequately treated   pt transported to clean and ready room in bed on 3 lpm NC with belongings per roverto, pacu transporter    Pain Level: 0    Report given to Receiving unit RN.    11/6/2024 7:41 PM

## 2024-11-07 NOTE — ANESTHESIA POSTPROCEDURE EVALUATION
Department of Anesthesiology  Postprocedure Note    Patient: Aide JAQUEZ  MRN: [id number]  YOB: 1955  Date of evaluation: 11/6/2024    Procedure Summary       Date: 11/06/24 Room / Location: [address]    Anesthesia Start: 1341 Anesthesia Stop: 1535    Procedure: ANTERIOR CERVICAL DISCECTOMY FUSION CERVICAL 4/CERVICAL 5, CERVICAL 5/CERVICAL 6, CERVICAL 6/CERVICAL 7 (Neck) Diagnosis:       Cervical stenosis of spine      Cervical spondylosis with radiculopathy      (Cervical stenosis of spine [M48.02])      (Cervical spondylosis with radiculopathy [M47.22])    Surgeons: David Barahona MD Responsible Provider: Sander Maxwell MD    Anesthesia Type: general ASA Status: 2            Anesthesia Type: No value filed.    Anton Phase I: Anton Score: 10    Anton Phase II:      Anesthesia Post Evaluation    Patient location during evaluation: PACU  Patient participation: complete - patient participated  Level of consciousness: awake and alert  Airway patency: patent  Nausea & Vomiting: no nausea and no vomiting  Cardiovascular status: blood pressure returned to baseline  Respiratory status: acceptable  Hydration status: euvolemic  Pain management: adequate    No notable events documented.

## 2024-11-08 PROCEDURE — 6370000000 HC RX 637 (ALT 250 FOR IP): Performed by: INTERNAL MEDICINE

## 2024-11-08 PROCEDURE — APPNB45 APP NON BILLABLE 31-45 MINUTES: Performed by: NURSE PRACTITIONER

## 2024-11-08 PROCEDURE — 6370000000 HC RX 637 (ALT 250 FOR IP): Performed by: NEUROLOGICAL SURGERY

## 2024-11-08 PROCEDURE — 6360000002 HC RX W HCPCS: Performed by: NEUROLOGICAL SURGERY

## 2024-11-08 PROCEDURE — 6360000002 HC RX W HCPCS

## 2024-11-08 PROCEDURE — 97535 SELF CARE MNGMENT TRAINING: CPT

## 2024-11-08 PROCEDURE — 6370000000 HC RX 637 (ALT 250 FOR IP)

## 2024-11-08 PROCEDURE — 6360000002 HC RX W HCPCS: Performed by: NURSE PRACTITIONER

## 2024-11-08 PROCEDURE — 6370000000 HC RX 637 (ALT 250 FOR IP): Performed by: NURSE PRACTITIONER

## 2024-11-08 PROCEDURE — 2060000000 HC ICU INTERMEDIATE R&B

## 2024-11-08 PROCEDURE — 2580000003 HC RX 258: Performed by: NEUROLOGICAL SURGERY

## 2024-11-08 PROCEDURE — 97530 THERAPEUTIC ACTIVITIES: CPT

## 2024-11-08 PROCEDURE — 97116 GAIT TRAINING THERAPY: CPT

## 2024-11-08 RX ORDER — OXYCODONE HYDROCHLORIDE 5 MG/1
10 TABLET ORAL EVERY 4 HOURS PRN
Status: DISCONTINUED | OUTPATIENT
Start: 2024-11-08 | End: 2024-11-10 | Stop reason: HOSPADM

## 2024-11-08 RX ORDER — METHOCARBAMOL 750 MG/1
750 TABLET, FILM COATED ORAL ONCE
Status: COMPLETED | OUTPATIENT
Start: 2024-11-08 | End: 2024-11-08

## 2024-11-08 RX ORDER — 0.9 % SODIUM CHLORIDE 0.9 %
500 INTRAVENOUS SOLUTION INTRAVENOUS ONCE
Status: DISCONTINUED | OUTPATIENT
Start: 2024-11-08 | End: 2024-11-08

## 2024-11-08 RX ORDER — OXYCODONE HYDROCHLORIDE 5 MG/1
5 TABLET ORAL EVERY 4 HOURS PRN
Status: DISCONTINUED | OUTPATIENT
Start: 2024-11-08 | End: 2024-11-10 | Stop reason: HOSPADM

## 2024-11-08 RX ORDER — DEXAMETHASONE SODIUM PHOSPHATE 4 MG/ML
10 INJECTION, SOLUTION INTRA-ARTICULAR; INTRALESIONAL; INTRAMUSCULAR; INTRAVENOUS; SOFT TISSUE ONCE
Status: COMPLETED | OUTPATIENT
Start: 2024-11-08 | End: 2024-11-08

## 2024-11-08 RX ADMIN — PANTOPRAZOLE SODIUM 40 MG: 40 TABLET, DELAYED RELEASE ORAL at 07:02

## 2024-11-08 RX ADMIN — BUSPIRONE HYDROCHLORIDE 10 MG: 10 TABLET ORAL at 09:26

## 2024-11-08 RX ADMIN — METHYLPREDNISOLONE 4 MG: 4 TABLET ORAL at 07:02

## 2024-11-08 RX ADMIN — GABAPENTIN 300 MG: 300 CAPSULE ORAL at 20:38

## 2024-11-08 RX ADMIN — BUSPIRONE HYDROCHLORIDE 10 MG: 10 TABLET ORAL at 20:38

## 2024-11-08 RX ADMIN — ACETAMINOPHEN 650 MG: 325 TABLET ORAL at 09:27

## 2024-11-08 RX ADMIN — SODIUM CHLORIDE, PRESERVATIVE FREE 10 ML: 5 INJECTION INTRAVENOUS at 09:38

## 2024-11-08 RX ADMIN — ESCITALOPRAM OXALATE 20 MG: 20 TABLET, FILM COATED ORAL at 20:38

## 2024-11-08 RX ADMIN — POLYETHYLENE GLYCOL 3350 17 G: 17 POWDER, FOR SOLUTION ORAL at 09:26

## 2024-11-08 RX ADMIN — OXYCODONE 10 MG: 5 TABLET ORAL at 16:22

## 2024-11-08 RX ADMIN — TROSPIUM CHLORIDE 20 MG: 20 TABLET, FILM COATED ORAL at 07:02

## 2024-11-08 RX ADMIN — METHOCARBAMOL TABLETS 750 MG: 750 TABLET, COATED ORAL at 22:57

## 2024-11-08 RX ADMIN — METHYLPREDNISOLONE 4 MG: 4 TABLET ORAL at 11:58

## 2024-11-08 RX ADMIN — AMITRIPTYLINE HYDROCHLORIDE 50 MG: 50 TABLET ORAL at 20:38

## 2024-11-08 RX ADMIN — OXYCODONE 5 MG: 5 TABLET ORAL at 11:58

## 2024-11-08 RX ADMIN — OXYCODONE 5 MG: 5 TABLET ORAL at 00:40

## 2024-11-08 RX ADMIN — ENOXAPARIN SODIUM 40 MG: 100 INJECTION SUBCUTANEOUS at 09:26

## 2024-11-08 RX ADMIN — ACETAMINOPHEN 650 MG: 325 TABLET ORAL at 00:40

## 2024-11-08 RX ADMIN — GABAPENTIN 300 MG: 300 CAPSULE ORAL at 15:16

## 2024-11-08 RX ADMIN — ACETAMINOPHEN 650 MG: 325 TABLET ORAL at 15:16

## 2024-11-08 RX ADMIN — TROSPIUM CHLORIDE 20 MG: 20 TABLET, FILM COATED ORAL at 16:20

## 2024-11-08 RX ADMIN — GABAPENTIN 300 MG: 300 CAPSULE ORAL at 09:26

## 2024-11-08 RX ADMIN — ACETAMINOPHEN 650 MG: 325 TABLET ORAL at 20:37

## 2024-11-08 RX ADMIN — MORPHINE SULFATE 2 MG: 2 INJECTION, SOLUTION INTRAMUSCULAR; INTRAVENOUS at 03:18

## 2024-11-08 RX ADMIN — METHYLPREDNISOLONE 8 MG: 4 TABLET ORAL at 20:36

## 2024-11-08 RX ADMIN — METHOCARBAMOL TABLETS 750 MG: 750 TABLET, COATED ORAL at 09:26

## 2024-11-08 RX ADMIN — METOPROLOL TARTRATE 25 MG: 25 TABLET, FILM COATED ORAL at 20:37

## 2024-11-08 RX ADMIN — METHYLPREDNISOLONE 4 MG: 4 TABLET ORAL at 17:44

## 2024-11-08 RX ADMIN — DEXAMETHASONE SODIUM PHOSPHATE 10 MG: 4 INJECTION, SOLUTION INTRAMUSCULAR; INTRAVENOUS at 22:57

## 2024-11-08 RX ADMIN — BUPROPION HYDROCHLORIDE 100 MG: 100 TABLET, FILM COATED ORAL at 09:26

## 2024-11-08 RX ADMIN — SERTRALINE 50 MG: 50 TABLET, FILM COATED ORAL at 09:26

## 2024-11-08 RX ADMIN — OXYCODONE 10 MG: 5 TABLET ORAL at 20:37

## 2024-11-08 RX ADMIN — OXYCODONE 5 MG: 5 TABLET ORAL at 07:02

## 2024-11-08 ASSESSMENT — PAIN SCALES - GENERAL
PAINLEVEL_OUTOF10: 6
PAINLEVEL_OUTOF10: 4
PAINLEVEL_OUTOF10: 4
PAINLEVEL_OUTOF10: 2
PAINLEVEL_OUTOF10: 2
PAINLEVEL_OUTOF10: 8
PAINLEVEL_OUTOF10: 5
PAINLEVEL_OUTOF10: 8
PAINLEVEL_OUTOF10: 6
PAINLEVEL_OUTOF10: 2
PAINLEVEL_OUTOF10: 4
PAINLEVEL_OUTOF10: 2
PAINLEVEL_OUTOF10: 4
PAINLEVEL_OUTOF10: 7

## 2024-11-08 ASSESSMENT — PAIN DESCRIPTION - ORIENTATION
ORIENTATION: POSTERIOR
ORIENTATION: POSTERIOR;ANTERIOR
ORIENTATION: MID
ORIENTATION: POSTERIOR
ORIENTATION: MID

## 2024-11-08 ASSESSMENT — PAIN DESCRIPTION - FREQUENCY
FREQUENCY: CONTINUOUS

## 2024-11-08 ASSESSMENT — PAIN - FUNCTIONAL ASSESSMENT

## 2024-11-08 ASSESSMENT — PAIN DESCRIPTION - LOCATION
LOCATION: NECK

## 2024-11-08 ASSESSMENT — PAIN DESCRIPTION - DESCRIPTORS
DESCRIPTORS: ACHING;DULL
DESCRIPTORS: ACHING

## 2024-11-08 ASSESSMENT — PAIN DESCRIPTION - PAIN TYPE
TYPE: SURGICAL PAIN
TYPE: SURGICAL PAIN
TYPE: CHRONIC PAIN;SURGICAL PAIN
TYPE: SURGICAL PAIN
TYPE: ACUTE PAIN;SURGICAL PAIN
TYPE: SURGICAL PAIN
TYPE: SURGICAL PAIN
TYPE: ACUTE PAIN;SURGICAL PAIN

## 2024-11-08 ASSESSMENT — PAIN DESCRIPTION - ONSET
ONSET: ON-GOING

## 2024-11-08 NOTE — PLAN OF CARE
Problem: Pain  Goal: Verbalizes/displays adequate comfort level or baseline comfort level  11/8/2024 1739 by Michael Corado, RN  Note: Feeling better after 2 oxy condones , that took the edge off , , incision washed with soap and water , chg wipe   11/8/2024 1553 by Michael Corado, RN  Note: Alert oriented times 4 , gait staedu , anterior neck , soft , swollen , incision well approximated CDI , able to swallow soft , food , pain med's , medicated with 2 oxycodone's , for 7 of 10 pain , ice to area

## 2024-11-08 NOTE — PLAN OF CARE
Problem: Pain  Goal: Verbalizes/displays adequate comfort level or baseline comfort level  Note: Alert oriented times 4 , gait staedu , anterior neck , soft , swollen , incision well approximated CDI , able to swallow soft , food , pain med's , medicated with 2 oxycodone's , for 7 of 10 pain , ice to area

## 2024-11-08 NOTE — PLAN OF CARE
Problem: Safety - Adult  Goal: Free from fall injury  11/7/2024 1925 by Hafsa Crawford, RN  Outcome: Progressing  Note: All fall precautions in place and call light is within reach.      Problem: Pain  Goal: Verbalizes/displays adequate comfort level or baseline comfort level  11/7/2024 1925 by Hafsa Crawford, RN  Outcome: Progressing  Note: Pt. Managing pain per MAR.

## 2024-11-09 ENCOUNTER — APPOINTMENT (OUTPATIENT)
Dept: CT IMAGING | Age: 69
DRG: 473 | End: 2024-11-09
Attending: NEUROLOGICAL SURGERY
Payer: MEDICARE

## 2024-11-09 PROCEDURE — 2580000003 HC RX 258: Performed by: NEUROLOGICAL SURGERY

## 2024-11-09 PROCEDURE — 6360000004 HC RX CONTRAST MEDICATION

## 2024-11-09 PROCEDURE — 2060000000 HC ICU INTERMEDIATE R&B

## 2024-11-09 PROCEDURE — 6370000000 HC RX 637 (ALT 250 FOR IP): Performed by: NEUROLOGICAL SURGERY

## 2024-11-09 PROCEDURE — 97535 SELF CARE MNGMENT TRAINING: CPT

## 2024-11-09 PROCEDURE — 6360000002 HC RX W HCPCS: Performed by: NURSE PRACTITIONER

## 2024-11-09 PROCEDURE — 6370000000 HC RX 637 (ALT 250 FOR IP): Performed by: NURSE PRACTITIONER

## 2024-11-09 PROCEDURE — 97530 THERAPEUTIC ACTIVITIES: CPT

## 2024-11-09 PROCEDURE — APPNB30 APP NON BILLABLE TIME 0-30 MINS: Performed by: NURSE PRACTITIONER

## 2024-11-09 PROCEDURE — 99024 POSTOP FOLLOW-UP VISIT: CPT | Performed by: NURSE PRACTITIONER

## 2024-11-09 PROCEDURE — 6370000000 HC RX 637 (ALT 250 FOR IP): Performed by: INTERNAL MEDICINE

## 2024-11-09 PROCEDURE — 6360000002 HC RX W HCPCS: Performed by: NEUROLOGICAL SURGERY

## 2024-11-09 PROCEDURE — 70491 CT SOFT TISSUE NECK W/DYE: CPT

## 2024-11-09 RX ORDER — IOPAMIDOL 755 MG/ML
75 INJECTION, SOLUTION INTRAVASCULAR
Status: COMPLETED | OUTPATIENT
Start: 2024-11-09 | End: 2024-11-09

## 2024-11-09 RX ADMIN — SODIUM CHLORIDE, PRESERVATIVE FREE 5 ML: 5 INJECTION INTRAVENOUS at 21:27

## 2024-11-09 RX ADMIN — OXYCODONE 10 MG: 5 TABLET ORAL at 22:28

## 2024-11-09 RX ADMIN — OXYCODONE 10 MG: 5 TABLET ORAL at 12:27

## 2024-11-09 RX ADMIN — ACETAMINOPHEN 650 MG: 325 TABLET ORAL at 21:19

## 2024-11-09 RX ADMIN — ACETAMINOPHEN 650 MG: 325 TABLET ORAL at 10:23

## 2024-11-09 RX ADMIN — BUPROPION HYDROCHLORIDE 100 MG: 100 TABLET, FILM COATED ORAL at 10:23

## 2024-11-09 RX ADMIN — OXYCODONE 10 MG: 5 TABLET ORAL at 18:12

## 2024-11-09 RX ADMIN — METHYLPREDNISOLONE 4 MG: 4 TABLET ORAL at 07:10

## 2024-11-09 RX ADMIN — SERTRALINE 50 MG: 50 TABLET, FILM COATED ORAL at 10:22

## 2024-11-09 RX ADMIN — METOPROLOL TARTRATE 25 MG: 25 TABLET, FILM COATED ORAL at 21:19

## 2024-11-09 RX ADMIN — POLYETHYLENE GLYCOL 3350 17 G: 17 POWDER, FOR SOLUTION ORAL at 10:21

## 2024-11-09 RX ADMIN — METHYLPREDNISOLONE 4 MG: 4 TABLET ORAL at 12:28

## 2024-11-09 RX ADMIN — SODIUM CHLORIDE, PRESERVATIVE FREE 10 ML: 5 INJECTION INTRAVENOUS at 10:24

## 2024-11-09 RX ADMIN — ALPRAZOLAM 1 MG: 0.5 TABLET ORAL at 10:38

## 2024-11-09 RX ADMIN — BUSPIRONE HYDROCHLORIDE 10 MG: 10 TABLET ORAL at 21:18

## 2024-11-09 RX ADMIN — OXYCODONE 10 MG: 5 TABLET ORAL at 02:26

## 2024-11-09 RX ADMIN — PANTOPRAZOLE SODIUM 40 MG: 40 TABLET, DELAYED RELEASE ORAL at 07:05

## 2024-11-09 RX ADMIN — OXYCODONE 10 MG: 5 TABLET ORAL at 07:04

## 2024-11-09 RX ADMIN — IOPAMIDOL 75 ML: 755 INJECTION, SOLUTION INTRAVENOUS at 02:36

## 2024-11-09 RX ADMIN — METHYLPREDNISOLONE 4 MG: 4 TABLET ORAL at 18:12

## 2024-11-09 RX ADMIN — ENOXAPARIN SODIUM 40 MG: 100 INJECTION SUBCUTANEOUS at 10:22

## 2024-11-09 RX ADMIN — AMITRIPTYLINE HYDROCHLORIDE 50 MG: 50 TABLET ORAL at 21:19

## 2024-11-09 RX ADMIN — ACETAMINOPHEN 650 MG: 325 TABLET ORAL at 14:56

## 2024-11-09 RX ADMIN — GABAPENTIN 300 MG: 300 CAPSULE ORAL at 21:18

## 2024-11-09 RX ADMIN — ESCITALOPRAM OXALATE 20 MG: 20 TABLET, FILM COATED ORAL at 21:18

## 2024-11-09 RX ADMIN — BUSPIRONE HYDROCHLORIDE 10 MG: 10 TABLET ORAL at 10:23

## 2024-11-09 RX ADMIN — ALPRAZOLAM 1 MG: 0.5 TABLET ORAL at 22:28

## 2024-11-09 RX ADMIN — ACETAMINOPHEN 650 MG: 325 TABLET ORAL at 02:26

## 2024-11-09 RX ADMIN — TROSPIUM CHLORIDE 20 MG: 20 TABLET, FILM COATED ORAL at 14:56

## 2024-11-09 RX ADMIN — TROSPIUM CHLORIDE 20 MG: 20 TABLET, FILM COATED ORAL at 07:04

## 2024-11-09 RX ADMIN — METHYLPREDNISOLONE 4 MG: 4 TABLET ORAL at 21:19

## 2024-11-09 RX ADMIN — GABAPENTIN 300 MG: 300 CAPSULE ORAL at 14:56

## 2024-11-09 RX ADMIN — GABAPENTIN 300 MG: 300 CAPSULE ORAL at 10:23

## 2024-11-09 ASSESSMENT — PAIN DESCRIPTION - PAIN TYPE
TYPE: SURGICAL PAIN

## 2024-11-09 ASSESSMENT — PAIN DESCRIPTION - FREQUENCY
FREQUENCY: CONTINUOUS

## 2024-11-09 ASSESSMENT — PAIN - FUNCTIONAL ASSESSMENT
PAIN_FUNCTIONAL_ASSESSMENT: ACTIVITIES ARE NOT PREVENTED

## 2024-11-09 ASSESSMENT — PAIN DESCRIPTION - ONSET
ONSET: ON-GOING

## 2024-11-09 ASSESSMENT — PAIN SCALES - GENERAL
PAINLEVEL_OUTOF10: 4
PAINLEVEL_OUTOF10: 5
PAINLEVEL_OUTOF10: 3
PAINLEVEL_OUTOF10: 5
PAINLEVEL_OUTOF10: 6
PAINLEVEL_OUTOF10: 5
PAINLEVEL_OUTOF10: 2
PAINLEVEL_OUTOF10: 7
PAINLEVEL_OUTOF10: 8
PAINLEVEL_OUTOF10: 8
PAINLEVEL_OUTOF10: 5

## 2024-11-09 ASSESSMENT — PAIN DESCRIPTION - ORIENTATION
ORIENTATION: MID
ORIENTATION: ANTERIOR
ORIENTATION: MID

## 2024-11-09 ASSESSMENT — PAIN DESCRIPTION - DESCRIPTORS
DESCRIPTORS: ACHING;DISCOMFORT;SHOOTING
DESCRIPTORS: ACHING

## 2024-11-09 ASSESSMENT — PAIN DESCRIPTION - LOCATION
LOCATION: NECK

## 2024-11-09 ASSESSMENT — PAIN DESCRIPTION - DIRECTION
RADIATING_TOWARDS: BILAT SHOULDERS
RADIATING_TOWARDS: RIGHT SHOULDER
RADIATING_TOWARDS: RIGHT SHOULDER

## 2024-11-09 NOTE — PLAN OF CARE
Problem: Safety - Adult  Goal: Free from fall injury  Outcome: Progressing  Note: All fall precautions in place and call light is within reach.      Problem: Pain  Goal: Verbalizes/displays adequate comfort level or baseline comfort level  11/9/2024 0459 by Hafsa Crawford, RN  Outcome: Progressing  Note: Pt. Managing pain per MAR.

## 2024-11-09 NOTE — PLAN OF CARE
POD 2 s/p C4-7 ACDF   Called by patient's RN earlier this evening for worsening neck swelling around the patient's incision.  Per RN, patient's voice was clear, and she was able to speak full sentences.  She denied any shortness of breath, dysphagia, and stated she had good ability to maintain her secretions.  Neuroexam stable compared to earlier.  Orders were placed for 10 mg IV Decadron x 1 dose.  A few hours later, I received an additional call from the patient's nurse and she told me that the patient desatted to 86% when sleeping but was able to get her SpO2 to go back up without waking her up.  Patient's top out SpO2 was 92%.  Per patient's RN, patient has needed supplemental oxygen every night for the past 2 nights but patient denies any history of GAYATHRI.  Currently she is now on 2 L of oxygen and satting 93 to 95%.    I presented to bedside.  There was swelling noted around patient's incision, but per patient's RN, the swelling had improved since getting the Decadron earlier this evening and placing ice on the incision.  However the patient states her voice is more raspy, is more hypophonic and she states she is now having a hard time swallowing.  She states her swelling is worse now than it was before.    See neurologic exam below.      PLAN:  -STAT CT soft tissue neck  - Continuous pulse ox  and telemetry      General: Alert, no distress, well-nourished  Neurologic  Mental status:  Orientation: x4  Attention: Intact    Language: fluent  Speech: clear  Comprehension intact; follows simple commands        Motor Exam:  RUE 4/5   LUE 5/5  BLE 5/5     Sensory: light touch intact and symmetric in all 4 extremities.   Tone: normal in all 4 extremities     Incision - anterior neck - closed w/ glue, clean dry intact, slightly swollen, with mild erythema.       ADDENDUM:    CT soft tissue neck  IMPRESSION:  1.  Postsurgical changes with ACDF from C4 through C7. Orthopedic hardware is intact without evidence of

## 2024-11-10 VITALS
OXYGEN SATURATION: 94 % | RESPIRATION RATE: 16 BRPM | HEART RATE: 75 BPM | WEIGHT: 132.6 LBS | DIASTOLIC BLOOD PRESSURE: 76 MMHG | TEMPERATURE: 97.7 F | SYSTOLIC BLOOD PRESSURE: 121 MMHG | HEIGHT: 62 IN | BODY MASS INDEX: 24.4 KG/M2

## 2024-11-10 PROBLEM — Z98.1 S/P CERVICAL SPINAL FUSION: Status: ACTIVE | Noted: 2024-11-10

## 2024-11-10 PROCEDURE — 99024 POSTOP FOLLOW-UP VISIT: CPT | Performed by: NURSE PRACTITIONER

## 2024-11-10 PROCEDURE — 6370000000 HC RX 637 (ALT 250 FOR IP): Performed by: INTERNAL MEDICINE

## 2024-11-10 PROCEDURE — 6370000000 HC RX 637 (ALT 250 FOR IP): Performed by: NURSE PRACTITIONER

## 2024-11-10 PROCEDURE — 2580000003 HC RX 258: Performed by: NEUROLOGICAL SURGERY

## 2024-11-10 PROCEDURE — 6360000002 HC RX W HCPCS: Performed by: NEUROLOGICAL SURGERY

## 2024-11-10 PROCEDURE — APPNB30 APP NON BILLABLE TIME 0-30 MINS: Performed by: NURSE PRACTITIONER

## 2024-11-10 PROCEDURE — 6360000002 HC RX W HCPCS: Performed by: NURSE PRACTITIONER

## 2024-11-10 PROCEDURE — 6370000000 HC RX 637 (ALT 250 FOR IP): Performed by: NEUROLOGICAL SURGERY

## 2024-11-10 RX ORDER — POLYETHYLENE GLYCOL 3350 17 G/17G
17 POWDER, FOR SOLUTION ORAL DAILY
COMMUNITY
Start: 2024-11-11 | End: 2024-12-11

## 2024-11-10 RX ORDER — METHYLPREDNISOLONE 4 MG/1
TABLET ORAL
Qty: 11 TABLET | Refills: 0 | Status: SHIPPED | OUTPATIENT
Start: 2024-11-10 | End: 2024-11-15

## 2024-11-10 RX ORDER — OXYCODONE HYDROCHLORIDE 5 MG/1
5-10 TABLET ORAL EVERY 6 HOURS PRN
Qty: 56 TABLET | Refills: 0 | Status: SHIPPED | OUTPATIENT
Start: 2024-11-10 | End: 2024-11-17

## 2024-11-10 RX ADMIN — ACETAMINOPHEN 650 MG: 325 TABLET ORAL at 09:24

## 2024-11-10 RX ADMIN — SERTRALINE 50 MG: 50 TABLET, FILM COATED ORAL at 09:24

## 2024-11-10 RX ADMIN — TROSPIUM CHLORIDE 20 MG: 20 TABLET, FILM COATED ORAL at 05:13

## 2024-11-10 RX ADMIN — ACETAMINOPHEN 650 MG: 325 TABLET ORAL at 14:33

## 2024-11-10 RX ADMIN — BUPROPION HYDROCHLORIDE 100 MG: 100 TABLET, FILM COATED ORAL at 09:23

## 2024-11-10 RX ADMIN — ALPRAZOLAM 1 MG: 0.5 TABLET ORAL at 09:24

## 2024-11-10 RX ADMIN — METHYLPREDNISOLONE 4 MG: 4 TABLET ORAL at 11:47

## 2024-11-10 RX ADMIN — OXYCODONE 10 MG: 5 TABLET ORAL at 09:23

## 2024-11-10 RX ADMIN — GABAPENTIN 300 MG: 300 CAPSULE ORAL at 09:24

## 2024-11-10 RX ADMIN — ENOXAPARIN SODIUM 40 MG: 100 INJECTION SUBCUTANEOUS at 09:24

## 2024-11-10 RX ADMIN — OXYCODONE 5 MG: 5 TABLET ORAL at 05:16

## 2024-11-10 RX ADMIN — ACETAMINOPHEN 650 MG: 325 TABLET ORAL at 05:13

## 2024-11-10 RX ADMIN — PANTOPRAZOLE SODIUM 40 MG: 40 TABLET, DELAYED RELEASE ORAL at 05:13

## 2024-11-10 RX ADMIN — METHYLPREDNISOLONE 4 MG: 4 TABLET ORAL at 05:13

## 2024-11-10 RX ADMIN — SODIUM CHLORIDE, PRESERVATIVE FREE 10 ML: 5 INJECTION INTRAVENOUS at 09:30

## 2024-11-10 RX ADMIN — OXYCODONE 10 MG: 5 TABLET ORAL at 14:33

## 2024-11-10 RX ADMIN — POLYETHYLENE GLYCOL 3350 17 G: 17 POWDER, FOR SOLUTION ORAL at 09:24

## 2024-11-10 RX ADMIN — GABAPENTIN 300 MG: 300 CAPSULE ORAL at 14:34

## 2024-11-10 RX ADMIN — BUSPIRONE HYDROCHLORIDE 10 MG: 10 TABLET ORAL at 09:24

## 2024-11-10 ASSESSMENT — PAIN DESCRIPTION - ORIENTATION
ORIENTATION: MID
ORIENTATION: ANTERIOR
ORIENTATION: MID
ORIENTATION: ANTERIOR

## 2024-11-10 ASSESSMENT — PAIN DESCRIPTION - FREQUENCY
FREQUENCY: CONTINUOUS
FREQUENCY: CONTINUOUS

## 2024-11-10 ASSESSMENT — PAIN DESCRIPTION - LOCATION
LOCATION: NECK

## 2024-11-10 ASSESSMENT — PAIN DESCRIPTION - DIRECTION
RADIATING_TOWARDS: SHOULDERS
RADIATING_TOWARDS: SHOULDERS

## 2024-11-10 ASSESSMENT — PAIN DESCRIPTION - ONSET
ONSET: ON-GOING
ONSET: ON-GOING

## 2024-11-10 ASSESSMENT — PAIN SCALES - GENERAL
PAINLEVEL_OUTOF10: 5
PAINLEVEL_OUTOF10: 4
PAINLEVEL_OUTOF10: 5

## 2024-11-10 ASSESSMENT — PAIN DESCRIPTION - DESCRIPTORS
DESCRIPTORS: PRESSURE;ACHING
DESCRIPTORS: ACHING
DESCRIPTORS: ACHING
DESCRIPTORS: ACHING;PRESSURE

## 2024-11-10 ASSESSMENT — PAIN DESCRIPTION - PAIN TYPE
TYPE: SURGICAL PAIN
TYPE: SURGICAL PAIN

## 2024-11-10 NOTE — PROGRESS NOTES
NEUROSURGERY PROGRESS NOTE       Patient Name: Aide JAQUEZ YOB: 1955   Sex: Female Age: 68 yrs     CC  Cervical stenosis    Subjective / ROS / Updates over last 24 hours:  POD#1 s/p C4-7 ACDF  Continuing to have problems swallowing, but states it is not any worse than yesterday  Pain is worse today    ASSESSMENT & RECOMMENDATIONS   Paitnet is 67 y/o F w/p C4-7 ACDF on 11/6    Plan  Neurologic exam frequency: Q4H  Difficulty swallowing this morning, neck is swollen and slightly red.  Decadron 10mg IV x 1 now  Followed by medrol dose pack  Mobility: PT/OT   [facility]: 18  Brace: [place]  - where brace when up and walking around  Diet: PO diet - advance as able, may need to have softer foods for the next 2-3 day, avoid food that will be difficult to swallow  Antibiotics: completed post op course  Drain: Removed yesterday  DVT Prophylaxis: SCDs & Lovenox  GI Prophylaxis: not indicated  Bowel Regimen: Glycolax   Pain control: PRN oxycodone / IV morphine PRN / scheduled tylenol  Muscle relaxer: Scheduled Robaxin  Incisional Care: Keep JOSE - wash daily w/ soap and water. Okay to shower  Still requiring supplemental O2 - Discussed rule of IS  Continue home medications as ordered  Will ask hospitalist to evaluate if unable to discharge home tomorrow    Dispo - likely tomorrow, pending O2 requirement / pain control / mobility   Management and plan discussed with:  Nursing staff  Dr. Jun Foster, APRN - CNP   NEUROSURGERY  [phone]  11/8/2024 12:52 PM  [facility]: [facility] NEUROSURGERY    HISTORY     Allergies Allergies   Allergen Reactions    Ciprofloxacin Other (See Comments)     shakiness    Codeine Itching and Rash      Diet ADULT DIET; Regular   Isolation No active isolations     LABS   Metabolic Panel Recent Labs     11/07/24  0804      K 4.3      CO2 27   BUN 12   CREATININE 0.9   GLUCOSE 81   CALCIUM 8.7      CBC / Coags Recent Labs     11/07/24  0804   WBC 5.8 
    [facility] PRE-SURGICAL TESTING INSTRUCTIONS                      PRIOR TO PROCEDURE DATE:    1. PLEASE FOLLOW ANY INSTRUCTIONS GIVEN TO YOU PER YOUR SURGEON.      2. Arrange for someone to drive you home and be with you for the first 24 hours after discharge for your safety after your procedure for which you received sedation. Ensure it is someone we can share information with regarding your discharge.     NOTE: At this time ONLY 2 ADULTS may accompany you   One person ENCOURAGED to stay at hospital entire time if outpatient surgery      3. You must contact your surgeon for instructions IF:  You are taking any blood thinners, aspirin, anti-inflammatory or vitamins.  There is a change in your physical condition such as a cold, fever, rash, cuts, sores, or any other infection, especially near your surgical site.    4. Do not drink alcohol the day before or day of your procedure.  Do not use any recreational marijuana at least 24 hours or street drugs (heroin, cocaine) at minimum 5 days prior to your procedure.     5. A Pre-Surgical History and Physical MUST be completed WITHIN 30 DAYS OR LESS prior to your procedure.by your Physician or an Urgent Care        THE DAY OF YOUR PROCEDURE:  1.  Follow instructions for ARRIVAL TIME as DIRECTED BY YOUR SURGEON.     2. Enter the MAIN entrance from [facility] and follow the signs to the free Parking Garage or  Parking (offered free of charge 7 am-5pm).      3. Enter the Main Entrance of the hospital (do not enter from the lower level of the parking garage). Upon entrance, check in with the  at the surgical information desk on your LEFT.   Bring your insurance card and photo ID to register      4. DO NOT EAT ANYTHING 8 hours prior to arrival for surgery.  You may have up to 8 ounces of water 4 hours prior to your arrival for surgery.   NOTE: ALL Gastric, Bariatric & Bowel surgery patients - you MUST follow your surgeon's instructions regarding 
KENN drain removed per order and patient tolerated well. Gauze and tegaderm placed over site.     After steroid dose, patient endorsing a slight improvement with swallowing but still having difficulty with food and pills.   O2 removed while sitting in bed. Oxygen saturation dropped to 88% on RA. Patient placed back on 3 L O2 with saturation up to 93%. Denies any SOB or chest pain.     Neurosurgery made aware of slightly improved swallowing and oxygen requirement.   
Patient endorsing difficulty/painful swallowing. Unable to swallow food, states \"it feels like the food is just sitting at the back of my throat\". Able to cough up food with weak cough. Able to swallow small sips of water.Anterior neck incision remains JOSE, slight amount of swelling noted. Neurosurgery made aware, stated they would be by shortly to evaluate.     1 time does of dexamethasone ordered.   Dexamethasone administered at 0953.   
Patient is A/Ox4. VSS with exception to needing 2 L O2 nasal cannula. No acute neuro changes throughout shift. Surgical site to anterior neck, R side, remains JOSE. No drainage noted. Still having some difficulty swallowing pills and food but expressed improvement after steroid administration. Surgical pain controlled with PRN medications per the MAR. Patient to wear C-collar when OOB.     Denies n/v. Voiding without complications. Ambulating x1 assist with walker and gait belt. Fall precautions in place. Bed locked in lowest position with alarm on. Call light within reach and patient using appropriately. Plan of care continues.   
Patient is alert and oriented x4. VSS on RA. Pain managed per MAR with no adverse effects. Pt is x1 with GB/walker. Pain endorsed to neck, with reports of throat pain/soreness with swallowing. Patient is tolerating fluids well. Patient is resting in bed with fall/safety precautions in place. Call light within reach. Plan of care continues.   
Patient is alert and oriented x4. VSS on room air. Medications given per MAR, no side effects noted. Patient ambulating x1 with gait belt and walker. Pain endorsed to neck, continuing to monitor and manage per MAR. Voiding well via BRP.     Patient is currently resting in bed with bed alarm on for safety. Call light within reach and all fall precautions in place. Plan of care continues.  
Physical Therapy  Attempt  Chart review completed.  Attempted to see pt at 1249 on 11/10/24.  Pt found supine in bed upon PT arrival.  Pt politely declining session at this time, reporting that her family is on their way to pick her up for d/c.  PT educating pt on importance of OOB mobility, offering to take pt for a walk or assist pt to restroom prior to leaving.  Pt continued to politely decline, stating that she has already been up and has no concerns for d/c.    Marivel Ch, PT      
Physical Therapy  Facility/Department: [facility] ORTHO/NEURO  Physical Therapy Initial Assessment/Treatment    Name: Aide JAQUEZ  : 1955  MRN: [id number]  Date of Service: 2024    Discharge Recommendations:  24 hour supervision or assist   PT Equipment Recommendations  Equipment Needed: No (pt has RW)      Past Medical History:  has a past medical history of Anxiety and depression, Arthritis, Frequent urination, Hyperlipidemia, Hypertension, IBS (irritable bowel syndrome), Irregular heartbeat, Prolonged emergence from general anesthesia, UTI (lower urinary tract infection), and Wears glasses.  Past Surgical History:  has a past surgical history that includes Carpal tunnel release (Bilateral); Appendectomy; Hysterectomy; Foot surgery (Right); Eye surgery (Left); Tubal ligation; Cystocopy (10/20/2014); joint replacement (Right); Urethral Surgery (N/A, 10/17/2022); Cystoscopy (N/A, 3/30/2023); and cervical fusion (N/A, 2024).    Assessment  Body Structures, Functions, Activity Limitations Requiring Skilled Therapeutic Intervention: Decreased functional mobility   Assessment: Pt functioning below baseline s/p neck surgery. Anticipate good progress & that pt will likely go home upon D/C. Recommend initial 24-hr assistance. Will follow while here to maximize independence.  Treatment Diagnosis: decreased mobility  Therapy Prognosis: Good  Decision Making: Low Complexity  Requires PT Follow-Up: Yes  Activity Tolerance  Activity Tolerance: Patient tolerated evaluation without incident    Plan  Physical Therapy Plan  General Plan: 5-7 times per week  Current Treatment Recommendations: Functional mobility training, Transfer training, Endurance training, Gait training, Stair training, Safety education & training, Therapeutic activities  Safety Devices  Type of Devices: Left in bed, Bed alarm in place, Call light within reach, Nurse notified    Restrictions  Position Activity Restriction  Other 
Pt arrived in pacu on bed with oral airway and NC at 3 lpm     pt became very agitated after CRNA left, oral airway out and placed on NRB at 10liters pt states no pain or sob.  Vss.  ANTERIOR CERVICAL DISCECTOMY FUSION CERVICAL 4/CERVICAL 5, CERVICAL 5/CERVICAL 6, CERVICAL 6/CERVICAL 7  David Barahona MD  
Pt. Oriented x4. VSS on 2L nasal cannula.  Swelling to neck noted to be increased around 10:45, NCC notified, decadron ordered/given, pt. Placed on tele and continuous pulse ox.Pt. Dropped to 86% on RA while sleeping, went up to 92% on her own without waking up, pt. Continually fluctuates up and down while sleeping. Upon waking up pt. 93-94% on RA. Pt. Placed on 2L nasal cannula while sleeping. NCC notified once more, stat CT ordered. Pt. Managing pain per MAR. All fall precautions in place and call light is within reach.   
Pt. Oriented x4. VSS on 4L nasal cannula. Pt. Managing pain per MAR. Pt. Denies numbness/tingling. All fall precautions in place and call light is within reach.   
Pt. Oriented x4. VSS on RA. Pt. Managing pain per MAR. Pt. Voiding well via BRP. No drainage noted from incision, no swelling. All fall precautions in place and call light is within reach.   
1: supine<->sit modified independent   Short Term Goal 2: sit<->stand SUPV   Short Term Goal 3: Amb 150 ft with RW SBA- MET 11/8.  Revised:  Ambulate 250' with RW supervisioin.  Short Term Goal 4: up/down 4 stairs, 1 rail SBA       Plan:  Plan: 5-7x/week  Current Treatment Recommendations: Functional mobility training, Transfer training, Endurance training, Gait training, Stair training, Safety education & training, Therapeutic activities    Therapy Time    Individual  Concurrent  Group  Co-treatment    Time In  938            Time Out  1008            Minutes  30              Timed Code Treatment Minutes: 30  Total Treatment Minutes: 30    Will continue per plan of care if not D/Jaret home today.   If patient is discharged prior to next treatment, this note will serve as the discharge summary.    Precious Haynes, PTA #[id number]  Linda Swain, PT [id number]                
\"PHENYTOIN\", \"LACOSA\", \"LAMO\", \"VALPROATE\", \"LACTSEPSIS\", \"LACTA\"     CURRENT SCHEDULED MEDICATIONS   Inpatient Medications     trospium, 20 mg, Oral, BID AC    methylPREDNISolone, 4 mg, Oral, QAM AC    methylPREDNISolone, 4 mg, Oral, Lunch    methylPREDNISolone, 4 mg, Oral, Dinner    methylPREDNISolone, 4 mg, Oral, Nightly    amitriptyline, 50 mg, Oral, Nightly    buPROPion, 100 mg, Oral, Daily    busPIRone, 10 mg, Oral, BID    escitalopram, 20 mg, Oral, Nightly    gabapentin, 300 mg, Oral, TID    metoprolol tartrate, 25 mg, Oral, Nightly    pantoprazole, 40 mg, Oral, QAM AC    sertraline, 50 mg, Oral, Daily    sodium chloride flush, 5-40 mL, IntraVENous, 2 times per day    acetaminophen, 650 mg, Oral, Q6H    polyethylene glycol, 17 g, Oral, Daily    enoxaparin, 40 mg, SubCUTAneous, Daily   Infusions    sodium chloride        Antibiotics   Recent Abx Admin        No antibiotic orders with administrations found.                      DIAGNOSTICS   IMAGES:  No iamging to review    PHYSICAL EXAMINATION     PHYSICAL EXAM:  Vitals:    11/09/24 0256 11/09/24 0315 11/09/24 0704 11/09/24 0800   BP:  116/65  122/75   Pulse:  61  81   Resp: 16 16 16 20   Temp:  97.7 °F (36.5 °C)  97.9 °F (36.6 °C)   TempSrc:  Temporal  Oral   SpO2:  95%  98%   Weight:       Height:             General: Alert, no distress, well-nourished  Neurologic  Mental status:  Orientation: x4  Attention: Intact    Language: fluent  Speech: clear  Comprehension intact; follows simple commands      Motor Exam:  RUE 4/5   LUE 5/5  BLE 5/5    Sensory: light touch intact and symmetric in all 4 extremities.   Tone: normal in all 4 extremities    Incision - anterior neck - closed w/ glue, clean dry intact, slightly swollen this morning with mild erythema.     Please call Neuro-surgery will any changes to patients exam  If it is at night please call neuro-critical care NP who covers for Neurosurgery team ([phone])    SHREYA Gomez-KARELYN  Neurosurgery 
found for: \"PHENYTOIN\", \"LACOSA\", \"LAMO\", \"VALPROATE\", \"LACTSEPSIS\", \"LACTA\"     CURRENT SCHEDULED MEDICATIONS   Inpatient Medications     dexAMETHasone, 10 mg, IntraVENous, Once    amitriptyline, 50 mg, Oral, Nightly    buPROPion, 100 mg, Oral, Daily    busPIRone, 10 mg, Oral, BID    escitalopram, 20 mg, Oral, Nightly    gabapentin, 300 mg, Oral, TID    metoprolol tartrate, 25 mg, Oral, Nightly    pantoprazole, 40 mg, Oral, QAM AC    sertraline, 50 mg, Oral, Daily    vibegron, 75 mg, Oral, Daily    sodium chloride flush, 5-40 mL, IntraVENous, 2 times per day    acetaminophen, 650 mg, Oral, Q6H    polyethylene glycol, 17 g, Oral, Daily    enoxaparin, 40 mg, SubCUTAneous, Daily   Infusions    sodium chloride      0.9% NaCl with KCl 20 mEq 75 mL/hr at 11/06/24 2102      Antibiotics   Recent Abx Admin                     ceFAZolin (ANCEF) 2,000 mg in sterile water 20 mL IV syringe (mg) 2,000 mg Given 11/07/24 0559     2,000 mg Given 11/06/24 2041    ceFAZolin (ANCEF) 2,000 mg in sterile water 20 mL IV syringe (mg) 2,000 mg New Bag 11/06/24 1349                      DIAGNOSTICS   IMAGES:  No iamging to review    PHYSICAL EXAMINATION     PHYSICAL EXAM:  Vitals:    11/07/24 0344 11/07/24 0558 11/07/24 0628 11/07/24 0710   BP:       Pulse: 79      Resp: 16 16 16 16   Temp:       TempSrc:       SpO2: 98%      Weight:       Height:             General: Alert, no distress, well-nourished  Neurologic  Mental status:  Orientation: x4  Attention: Intact    Language: fluent  Speech: clear  Comprehension intact; follows simple commands    Cranial nerves:   Pupils equal   Gaze conjugate  Face symmetric  Sensation intact  No dysarthria      Motor Exam:  RUE 4/5 (had previously, slightly better now)  LUE 5/5  BLE 5/5    Sensory: light touch intact and symmetric in all 4 extremities.   Tone: normal in all 4 extremities    Incision - anterior neck - closed w/ glue, clean dry intact  Drain - 25ml    
mg, Oral, Lunch    methylPREDNISolone, 4 mg, Oral, Nightly    amitriptyline, 50 mg, Oral, Nightly    buPROPion, 100 mg, Oral, Daily    busPIRone, 10 mg, Oral, BID    escitalopram, 20 mg, Oral, Nightly    gabapentin, 300 mg, Oral, TID    metoprolol tartrate, 25 mg, Oral, Nightly    pantoprazole, 40 mg, Oral, QAM AC    sertraline, 50 mg, Oral, Daily    sodium chloride flush, 5-40 mL, IntraVENous, 2 times per day    acetaminophen, 650 mg, Oral, Q6H    polyethylene glycol, 17 g, Oral, Daily    enoxaparin, 40 mg, SubCUTAneous, Daily   Infusions    sodium chloride        Antibiotics   Recent Abx Admin        No antibiotic orders with administrations found.                      DIAGNOSTICS   IMAGES:  No iamging to review    PHYSICAL EXAMINATION     PHYSICAL EXAM:  Vitals:    11/10/24 0018 11/10/24 0446 11/10/24 0513 11/10/24 0546   BP: 98/66 110/68     Pulse: 67 70     Resp: 16 16 15 14   Temp: 97.7 °F (36.5 °C) 97.4 °F (36.3 °C)     TempSrc: Oral Axillary     SpO2: 95% 92%     Weight:       Height:             General: Alert, no distress, well-nourished  Neurologic  Mental status:  Orientation: x4  Attention: Intact    Language: fluent  Speech: clear  Comprehension intact; follows simple commands      Motor Exam:  RUE 4/5   LUE 5/5  BLE 5/5    Sensory: light touch intact and symmetric in all 4 extremities.   Tone: normal in all 4 extremities    Incision - anterior neck - closed w/ glue, clean dry intact, slightly swollen this morning with mild erythema.     Please call Neuro-surgery will any changes to patients exam  If it is at night please call neuro-critical care NP who covers for Neurosurgery team ([phone])    SHREYA Gomez-NIKKIE  Neurosurgery NP  [phone]   
                 AM-PAC - ADL  AM-PAC Daily Activity - Inpatient   How much help is needed for putting on and taking off regular lower body clothing?: A Little  How much help is needed for bathing (which includes washing, rinsing, drying)?: A Little  How much help is needed for toileting (which includes using toilet, bedpan, or urinal)?: A Little  How much help is needed for putting on and taking off regular upper body clothing?: A Little  How much help is needed for taking care of personal grooming?: A Little  How much help for eating meals?: A Little  [facility]: 18  AM-PAC Inpatient ADL T-Scale Score : 38.66  ADL Inpatient CMS 0-100% Score: 46.65  ADL Inpatient CMS G-Code Modifier : CK    Goals  Short Term Goals  Time Frame for Short Term Goals: DC  Short Term Goal 1: toilet t/f mod I  Short Term Goal 2: stance tolerance x8 mins mod I  Short Term Goal 3: toileting mod I  Short Term Goal 4: LB dressing mod I      Therapy Time   Individual Concurrent Group Co-treatment   Time In 0855         Time Out 0933         Minutes 38             Timed Code Treatment Minutes:   23 mins    Total Treatment Minutes:  38 mins    If co-treatement indicated, patient seen for co-treatment due to: requirement of two skilled therapists, patient safety, patient decreased activity tolerance/unable to tolerate multiple sessions, and/or cognitive status.      CHUCKY Trinidad/L     
Modifier : CJ         Goals  Short Term Goals  Time Frame for Short Term Goals: DC  Short Term Goal 1: toilet t/f mod I  Short Term Goal 2: stance tolerance x8 mins mod I - ongoing  Short Term Goal 3: toileting mod I - ongoing  Short Term Goal 4: LB dressing mod I- ongoing      Therapy Time   Individual Concurrent Group Co-treatment   Time In 0822         Time Out 0920         Minutes 58         Timed Code Treatment Minutes: 58 Minutes       GUILLERMO Reza/ELVI

## 2024-11-10 NOTE — DISCHARGE SUMMARY
as: ZOLOFT     zolpidem 10 MG tablet  Commonly known as: AMBIEN            STOP taking these medications      aspirin 81 MG chewable tablet     butalbital-acetaminophen-caffeine -40 MG per tablet  Commonly known as: FIORICET, ESGIC     cyclobenzaprine 10 MG tablet  Commonly known as: FLEXERIL     dicyclomine 20 MG tablet  Commonly known as: BENTYL     L-Lysine HCl 500 MG Tabs     meloxicam 15 MG tablet  Commonly known as: MOBIC     MULTIVITAMIN PO               Where to Get Your Medications        These medications were sent to [facility]/pharmacy #[id number] - [place], [place] - [address] - P [phone] - F [phone]  [address] [address]      Hours: 24-hours Phone: [phone]   methylPREDNISolone 4 MG tablet  oxyCODONE 5 MG immediate release tablet       You can get these medications from any pharmacy    You don't need a prescription for these medications  polyethylene glycol 17 g packet         Discharge Destination:  The patient was discharged to Home.     Follow-up:  The patient is to follow-up with David Barahona MD in the office in 2 weeks      Discharge Instructions:   Verbal and written discharge instructions were given to the patient at the time of discharge.  Electronically signed by: SHREYA Lynn CNP, APRN-CNP, 11/10/2024 9:53 AM  [phone]

## 2024-11-10 NOTE — PLAN OF CARE
Problem: Discharge Planning  Goal: Discharge to home or other facility with appropriate resources  11/10/2024 0115 by Emmanuel Tompkins  Outcome: Progressing  Flowsheets (Taken 11/10/2024 0115)  Discharge to home or other facility with appropriate resources:   Identify barriers to discharge with patient and caregiver   Identify discharge learning needs (meds, wound care, etc)   Refer to discharge planning if patient needs post-hospital services based on physician order or complex needs related to functional status, cognitive ability or social support system   Arrange for needed discharge resources and transportation as appropriate  Note: Pt to discharge to home   11/9/2024 2014 by Bessy Palm, RN  Outcome: Progressing  Flowsheets (Taken 11/9/2024 0800)  Discharge to home or other facility with appropriate resources: Identify barriers to discharge with patient and caregiver  Note: Pt involved in discharge planning. Barriers to discharge discussed with patient. Discharge learning needs identified. Discuss with patient any additional needed resources and transportation plans. Case management following plan of care.     Problem: Safety - Adult  Goal: Free from fall injury  11/10/2024 0115 by Emmanuel Tompkins  Outcome: Progressing  Flowsheets (Taken 11/10/2024 0115)  Free From Fall Injury:   Instruct family/caregiver on patient safety   Based on caregiver fall risk screen, instruct family/caregiver to ask for assistance with transferring infant if caregiver noted to have fall risk factors  Note: All fall/safety precautions in place  11/9/2024 2014 by Bessy Palm, RN  Outcome: Progressing  Flowsheets (Taken 11/9/2024 2013)  Free From Fall Injury: Instruct family/caregiver on patient safety  Note: Fall precautions in place. Bed is in lowest position, wheels locked, bed alarm on, non skid socks on. Call light and bedside table within reach. Pt calls out appropriately. Pt is up x1 GB walker. Will

## 2024-11-10 NOTE — PLAN OF CARE
Problem: Discharge Planning  Goal: Discharge to home or other facility with appropriate resources  Outcome: Progressing  Flowsheets (Taken 11/9/2024 0800)  Discharge to home or other facility with appropriate resources: Identify barriers to discharge with patient and caregiver  Note: Pt involved in discharge planning. Barriers to discharge discussed with patient. Discharge learning needs identified. Discuss with patient any additional needed resources and transportation plans. Case management following plan of care.     Problem: Safety - Adult  Goal: Free from fall injury  Outcome: Progressing  Flowsheets (Taken 11/9/2024 2013)  Free From Fall Injury: Instruct family/caregiver on patient safety  Note: Fall precautions in place. Bed is in lowest position, wheels locked, bed alarm on, non skid socks on. Call light and bedside table within reach. Pt calls out appropriately. Pt is up x1 GB walker. Will continue to assess and monitor.      Problem: Pain  Goal: Verbalizes/displays adequate comfort level or baseline comfort level  Outcome: Progressing  Note: Pt endorsing pain to neck and shoulders. Being treated with PRN pain medication, rest, and frequent repositioning with pillow support for comfort and pressure relief. Pt reports some relief from pain with above interventions.

## 2024-11-10 NOTE — CARE COORDINATION
CM Note: CM met with pt, pt's daughter and pt's spouse at bedside. Pt lives at home with spouse and will return home at discharge. Pt reports that when her spouse goes to work one of the pt's daughters will come to the house to ensure the pt is not alone. Pt has a rolling walker at home and will private purchase a shower chair if she does not have one at home. Pt reports no HHC, DME or CM needs for discharge home and family will transport.  Electronically signed by ADRI Bourne on 11/7/2024 at 2:36 PM  [phone]  
have family transportation home today. No HHC or CM needs for discharge home today.    COVID Result:  No results found for: \"COVID19\"    The Plan for Transition of Care is related to the following treatment goals of Cervical stenosis of spine [M48.02]  Cervical spondylosis with radiculopathy [M47.22]  Postoperative state [Z98.890]  Cervical stenosis of spinal canal [M48.02]    The Patient and/or patient representative Aide and her family were provided with a choice of provider and agrees with the discharge plan Yes    Freedom of choice list was provided with basic dialogue that supports the patient's individualized plan of care/goals and shares the quality data associated with the providers. Yes    Care Transitions patient: No    ADRI Bourne  [facility]  Case Management Department  Ph: [phone]  Fax: [phone]

## 2025-05-10 ENCOUNTER — OFFICE VISIT (OUTPATIENT)
Age: 70
End: 2025-05-10

## 2025-05-10 VITALS
RESPIRATION RATE: 18 BRPM | TEMPERATURE: 98.1 F | HEIGHT: 62 IN | OXYGEN SATURATION: 96 % | HEART RATE: 98 BPM | BODY MASS INDEX: 24.84 KG/M2 | DIASTOLIC BLOOD PRESSURE: 47 MMHG | WEIGHT: 135 LBS | SYSTOLIC BLOOD PRESSURE: 102 MMHG

## 2025-05-10 DIAGNOSIS — H10.32 ACUTE BACTERIAL CONJUNCTIVITIS OF LEFT EYE: Primary | ICD-10-CM

## 2025-05-10 RX ORDER — POLYMYXIN B SULFATE AND TRIMETHOPRIM 1; 10000 MG/ML; [USP'U]/ML
1 SOLUTION OPHTHALMIC 4 TIMES DAILY
Qty: 10 ML | Refills: 0 | Status: SHIPPED | OUTPATIENT
Start: 2025-05-10 | End: 2025-05-17

## 2025-05-10 NOTE — PROGRESS NOTES
Aide Cervantes (:  1955) is a 69 y.o. female,New patient, here for evaluation of the following chief complaint(s):  Conjunctivitis (Left eye is itchy and gunky. Sxs started a few days ago.)      Assessment & Plan :  Visit Diagnoses and Associated Orders         Acute bacterial conjunctivitis of left eye    -  Primary    trimethoprim-polymyxin b (POLYTRIM) 63657-8.1 UNIT/ML-% ophthalmic solution [47214]                   Patient was seen and evaluated for above symptoms.  Assessment consistent with acute bacterial conjunctivitis of left eye.  Prescribed Polytrim eyedrops 1 drop 4 times daily for 7 full days.  Instructed on good frequent hand hygiene.  Instructed no contact wearing or makeup.  Discussed May take a daily antihistamine for itch relief.  Instructed follow up with PCP in 3-5 days if symptoms worsen or fail to improve.  Patient verbalized and agreed to plan of care.       Subjective :    Conjunctivitis          69 y.o. female presents with symptoms of conjunctivitis. Reports onset of symptoms a few days ago  with left eye itchy and discomfort. Reports yesterday evening she noticed some discharge. Reports she then woke up with eye crusted shut.          Vitals:    05/10/25 1232   BP: (!) 102/47   BP Site: Right Upper Arm   Patient Position: Sitting   Pulse: 98   Resp: 18   Temp: 98.1 °F (36.7 °C)   TempSrc: Oral   SpO2: 96%   Weight: 61.2 kg (135 lb)   Height: 1.575 m (5' 2\")       No results found for this visit on 05/10/25.      Objective   Physical Exam  Vitals and nursing note reviewed.   Constitutional:       General: She is not in acute distress.     Appearance: Normal appearance. She is well-developed. She is not ill-appearing, toxic-appearing or diaphoretic.   HENT:      Head: Normocephalic and atraumatic.      Right Ear: Hearing normal. No mastoid tenderness. No hemotympanum. Tympanic membrane is not perforated, erythematous or bulging.      Left Ear: Hearing normal. No mastoid

## 2025-05-10 NOTE — PATIENT INSTRUCTIONS
Prescribed Polytrim eye drops. One drop 4 times daily for 7 full days.     Good frequent hand hygiene.     No contact wearing or makeup.    May take a daily antihistamine for itch relief.     Follow up with PCP in 3-5 days if symptoms worsen or fail to improve.

## (undated) DEVICE — CYSTO/BLADDER IRRIGATION SET, REGULATING CLAMP

## (undated) DEVICE — CYSTOSCOPY: Brand: MEDLINE INDUSTRIES, INC.

## (undated) DEVICE — DRAPE,UNDERBUTTOCKS,PCH,STERILE: Brand: MEDLINE

## (undated) DEVICE — SYMMETRY SHARP KERRISON® RONGEUR TIPS, THIN FOOTPLATE, 3 MM TIP, SINGLE USE, (3/BX): Brand: SYMMETRY SHARP KERRISON

## (undated) DEVICE — SPONGE,NEURO,0.5"X0.5",XR,STRL,10/PK: Brand: MEDLINE

## (undated) DEVICE — TOOL MR8-14MH30 MR8 14CM MATCH 3MM: Brand: MIDAS REX MR8

## (undated) DEVICE — MINOR SET UP: Brand: MEDLINE INDUSTRIES, INC.

## (undated) DEVICE — CATHETER URETH 14FR L16IN RED RUB INTMIT ROB MOD BARDX

## (undated) DEVICE — ORTHO PRE OP PACK: Brand: MEDLINE INDUSTRIES, INC.

## (undated) DEVICE — GLOVE SURG SZ 65 CRM LTX FREE POLYISOPRENE POLYMER BEAD ANTI

## (undated) DEVICE — TOWEL,STOP FLAG GOLD N-W: Brand: MEDLINE

## (undated) DEVICE — 1010 S-DRAPE TOWEL DRAPE 10/BX: Brand: STERI-DRAPE™

## (undated) DEVICE — SUTURE VICRYL SZ 2-0 L18IN ABSRB UD CT-1 L36MM 1/2 CIR J839D

## (undated) DEVICE — APPLICATOR MEDICATED 10.5 CC SOLUTION HI LT ORNG CHLORAPREP

## (undated) DEVICE — SUTURE MONOCRYL + SZ 4-0 L27IN ABSRB UD L19MM PS-2 3/8 CIR MCP426H

## (undated) DEVICE — GOWN SIRUS NONREIN XL W/TWL: Brand: MEDLINE INDUSTRIES, INC.

## (undated) DEVICE — SUTURE VICRYL + SZ 2-0 L18IN ABSRB UD CT1 L36MM 1/2 CIR VCP839D

## (undated) DEVICE — LIQUIBAND RAPID ADHESIVE 36/CS 0.8ML: Brand: MEDLINE

## (undated) DEVICE — BLADE ES L4IN INSUL EDGE

## (undated) DEVICE — SOLUTION PREP POVIDONE IOD FOR SKIN MUCOUS MEM PRIOR TO

## (undated) DEVICE — GLOVE SURG SZ 85 L12IN FNGR ORTHO 126MIL CRM LTX FREE

## (undated) DEVICE — PROTECTOR ULN NRV PUR FOAM HK LOOP STRP ANATOMICALLY

## (undated) DEVICE — NEEDLE SPNL 22GA L3.5IN BLK HUB S STL REG WALL FIT STYL W/

## (undated) DEVICE — PAD SANITARY MTRN TAB BELT WRP NS 11IN

## (undated) DEVICE — PAD,NON-ADHERENT,3X8,STERILE,LF,1/PK: Brand: MEDLINE

## (undated) DEVICE — NEEDLE SPNL 22GA L7IN BLK HUB S STL W/ QNCKE PNT W/OUT

## (undated) DEVICE — TRAP FLUID

## (undated) DEVICE — SPONGE GZ W4XL4IN COT 12 PLY TYP VII WVN C FLD DSGN

## (undated) DEVICE — SUTURE VCRL + SZ 2-0 L27IN ABSRB WHT SH 1/2 CIR TAPERCUT VCP417H

## (undated) DEVICE — DRAINBAG,ANTI-REFLUX TOWER,L/F,2000ML,LL: Brand: MEDLINE

## (undated) DEVICE — GLOVE SURG SZ 7 L12IN FNGR THK79MIL GRN LTX FREE

## (undated) DEVICE — SYRINGE,CONTROL,LL,FINGER,GRIP: Brand: MEDLINE INDUSTRIES, INC.

## (undated) DEVICE — SPONGE,NEURO,1"X1",XR,STRL,LF,10/PK: Brand: MEDLINE

## (undated) DEVICE — SYRINGE IRRIG 60ML SFT PLIABLE BLB EZ TO GRP 1 HND USE W/

## (undated) DEVICE — LAMINECTOMY: Brand: MEDLINE INDUSTRIES, INC.

## (undated) DEVICE — ELECTRODE PT RET AD L9FT HI MOIST COND ADH HYDRGEL CORDED

## (undated) DEVICE — SUTURE VICRYL + SZ 3-0 L27IN ABSRB UD L26MM SH 1/2 CIR VCP416H

## (undated) DEVICE — ADHESIVE SKIN CLOSURE TOP 36 CC HI VISC DERMBND MINI

## (undated) DEVICE — SPONGE,PEANUT,XRAY,ST,SM,3/8",5/CARD: Brand: MEDLINE INDUSTRIES, INC.

## (undated) DEVICE — CATHETER URETH 18FR 2CC BLLN SIL ELASTMR F 2 W BARDX

## (undated) DEVICE — NEEDLE,22GX1.5",REG,BEVEL: Brand: MEDLINE

## (undated) DEVICE — 3M™ TEGADERM™ CHG CHLORHEXIDINE GLUCONATE GEL PAD 1664, 25 EACH/CARTON, 4 CARTONS/CASE: Brand: 3M™ TEGADERM™

## (undated) DEVICE — SOLUTION IV IRRIG POUR BRL 0.9% SODIUM CHL 2F7124

## (undated) DEVICE — NEEDLE HYPO 25GA L1.5IN BVL ORIENTED ECLIPSE

## (undated) DEVICE — SOLUTION IV 1000ML 0.9% SOD CHL

## (undated) DEVICE — SYRINGE MED 10ML LUERLOCK TIP W/O SFTY DISP

## (undated) DEVICE — SOLUTION SURG PREP 26 CC PURPREP

## (undated) DEVICE — GLOVE SURG SZ 85 L12IN FNGR THK79MIL GRN LTX FREE

## (undated) DEVICE — BAG DRNGE 19OZ VYN LEG FLIP-FLO VLV FAB STRP DISPOZ-A-BG

## (undated) DEVICE — SOLUTION IRRIG 3000ML STRL H2O USP UROMATIC PLAS CONT

## (undated) DEVICE — SHEET,T,THYROID,STERILE: Brand: MEDLINE

## (undated) DEVICE — AGENT HEMOSTATIC SURGIFLOW MATRIX KIT W/THROMBIN

## (undated) DEVICE — DRAPE MICSCP W54XL150IN W/ 4 BINOC GLS LENS LEICA

## (undated) DEVICE — CATHETER URETH 12FR L16IN RED RUB INTMIT ROB MOD BARDX

## (undated) DEVICE — GARMENT,MEDLINE,DVT,INT,CALF,MED, GEN2: Brand: MEDLINE

## (undated) DEVICE — GLOVE SURG SZ 7 CRM LTX FREE POLYISOPRENE POLYMER BEAD ANTI

## (undated) DEVICE — GAUZE,SPONGE,4"X4",12PLY,WOVEN,NS,LF: Brand: MEDLINE

## (undated) DEVICE — NEPTUNE E-SEP SMOKE EVACUATION PENCIL, COATED, 70MM BLADE, PUSH BUTTON SWITCH: Brand: NEPTUNE E-SEP